# Patient Record
Sex: FEMALE | Race: WHITE | NOT HISPANIC OR LATINO | Employment: FULL TIME | URBAN - METROPOLITAN AREA
[De-identification: names, ages, dates, MRNs, and addresses within clinical notes are randomized per-mention and may not be internally consistent; named-entity substitution may affect disease eponyms.]

---

## 2021-02-14 ENCOUNTER — HOSPITAL ENCOUNTER (EMERGENCY)
Facility: HOSPITAL | Age: 31
End: 2021-02-14
Attending: EMERGENCY MEDICINE | Admitting: EMERGENCY MEDICINE
Payer: COMMERCIAL

## 2021-02-14 ENCOUNTER — HOSPITAL ENCOUNTER (OUTPATIENT)
Facility: HOSPITAL | Age: 31
Setting detail: OBSERVATION
Discharge: HOME | End: 2021-02-15
Attending: OBSTETRICS & GYNECOLOGY | Admitting: STUDENT IN AN ORGANIZED HEALTH CARE EDUCATION/TRAINING PROGRAM
Payer: COMMERCIAL

## 2021-02-14 ENCOUNTER — ANESTHESIA (EMERGENCY)
Dept: PERIOP | Facility: HOSPITAL | Age: 31
End: 2021-02-14
Payer: COMMERCIAL

## 2021-02-14 ENCOUNTER — APPOINTMENT (EMERGENCY)
Dept: ULTRASOUND IMAGING | Facility: HOSPITAL | Age: 31
End: 2021-02-14
Payer: COMMERCIAL

## 2021-02-14 ENCOUNTER — ANESTHESIA EVENT (EMERGENCY)
Dept: PERIOP | Facility: HOSPITAL | Age: 31
End: 2021-02-14
Payer: COMMERCIAL

## 2021-02-14 ENCOUNTER — APPOINTMENT (EMERGENCY)
Dept: CT IMAGING | Facility: HOSPITAL | Age: 31
End: 2021-02-14
Payer: COMMERCIAL

## 2021-02-14 VITALS
BODY MASS INDEX: 34.96 KG/M2 | DIASTOLIC BLOOD PRESSURE: 91 MMHG | OXYGEN SATURATION: 99 % | WEIGHT: 190 LBS | HEART RATE: 89 BPM | HEIGHT: 62 IN | RESPIRATION RATE: 19 BRPM | TEMPERATURE: 97.5 F | SYSTOLIC BLOOD PRESSURE: 149 MMHG

## 2021-02-14 VITALS — HEART RATE: 125 BPM

## 2021-02-14 DIAGNOSIS — O00.90 ECTOPIC PREGNANCY: Primary | ICD-10-CM

## 2021-02-14 DIAGNOSIS — O36.80X0 PREGNANCY OF UNKNOWN ANATOMIC LOCATION: Primary | ICD-10-CM

## 2021-02-14 DIAGNOSIS — O00.90 RUPTURED ECTOPIC PREGNANCY: ICD-10-CM

## 2021-02-14 LAB
ABO GROUP BLD: NORMAL
ABO GROUP BLD: NORMAL
ALBUMIN SERPL BCP-MCNC: 3.6 G/DL (ref 3.5–5)
ALP SERPL-CCNC: 55 U/L (ref 46–116)
ALT SERPL W P-5'-P-CCNC: 30 U/L (ref 12–78)
ANION GAP SERPL CALCULATED.3IONS-SCNC: 10 MMOL/L (ref 4–13)
AST SERPL W P-5'-P-CCNC: 18 U/L (ref 5–45)
B-HCG SERPL-ACNC: 986 MIU/ML
BACTERIA UR QL AUTO: NORMAL /HPF
BASOPHILS # BLD AUTO: 0.09 THOUSANDS/ΜL (ref 0–0.1)
BASOPHILS NFR BLD AUTO: 1 % (ref 0–1)
BILIRUB DIRECT SERPL-MCNC: 0.11 MG/DL (ref 0–0.2)
BILIRUB SERPL-MCNC: 0.4 MG/DL (ref 0.2–1)
BILIRUB UR QL STRIP: NEGATIVE
BLD GP AB SCN SERPL QL: NEGATIVE
BUN SERPL-MCNC: 9 MG/DL (ref 5–25)
CALCIUM SERPL-MCNC: 9.3 MG/DL (ref 8.3–10.1)
CHLORIDE SERPL-SCNC: 102 MMOL/L (ref 100–108)
CLARITY UR: CLEAR
CO2 SERPL-SCNC: 23 MMOL/L (ref 21–32)
COLOR UR: ABNORMAL
CREAT SERPL-MCNC: 0.66 MG/DL (ref 0.6–1.3)
EOSINOPHIL # BLD AUTO: 0.07 THOUSAND/ΜL (ref 0–0.61)
EOSINOPHIL NFR BLD AUTO: 1 % (ref 0–6)
ERYTHROCYTE [DISTWIDTH] IN BLOOD BY AUTOMATED COUNT: 12 % (ref 11.6–15.1)
GFR SERPL CREATININE-BSD FRML MDRD: 118 ML/MIN/1.73SQ M
GLUCOSE SERPL-MCNC: 114 MG/DL (ref 65–140)
GLUCOSE UR STRIP-MCNC: NEGATIVE MG/DL
HCG SERPL QL: POSITIVE
HCT VFR BLD AUTO: 35.4 % (ref 34.8–46.1)
HGB BLD-MCNC: 11.9 G/DL (ref 11.5–15.4)
HGB UR QL STRIP.AUTO: ABNORMAL
IMM GRANULOCYTES # BLD AUTO: 0.05 THOUSAND/UL (ref 0–0.2)
IMM GRANULOCYTES NFR BLD AUTO: 0 % (ref 0–2)
KETONES UR STRIP-MCNC: NEGATIVE MG/DL
LEUKOCYTE ESTERASE UR QL STRIP: NEGATIVE
LIPASE SERPL-CCNC: 103 U/L (ref 73–393)
LYMPHOCYTES # BLD AUTO: 2.8 THOUSANDS/ΜL (ref 0.6–4.47)
LYMPHOCYTES NFR BLD AUTO: 18 % (ref 14–44)
MCH RBC QN AUTO: 31.1 PG (ref 26.8–34.3)
MCHC RBC AUTO-ENTMCNC: 33.6 G/DL (ref 31.4–37.4)
MCV RBC AUTO: 92 FL (ref 82–98)
MONOCYTES # BLD AUTO: 0.84 THOUSAND/ΜL (ref 0.17–1.22)
MONOCYTES NFR BLD AUTO: 6 % (ref 4–12)
NEUTROPHILS # BLD AUTO: 11.4 THOUSANDS/ΜL (ref 1.85–7.62)
NEUTS SEG NFR BLD AUTO: 74 % (ref 43–75)
NITRITE UR QL STRIP: NEGATIVE
NON-SQ EPI CELLS URNS QL MICRO: NORMAL /HPF
NRBC BLD AUTO-RTO: 0 /100 WBCS
PH UR STRIP.AUTO: 5.5 [PH]
PLATELET # BLD AUTO: 373 THOUSANDS/UL (ref 149–390)
PMV BLD AUTO: 9 FL (ref 8.9–12.7)
POTASSIUM SERPL-SCNC: 3.9 MMOL/L (ref 3.5–5.3)
PROT SERPL-MCNC: 7.3 G/DL (ref 6.4–8.2)
PROT UR STRIP-MCNC: NEGATIVE MG/DL
RBC # BLD AUTO: 3.83 MILLION/UL (ref 3.81–5.12)
RBC #/AREA URNS AUTO: NORMAL /HPF
RH BLD: POSITIVE
RH BLD: POSITIVE
SODIUM SERPL-SCNC: 135 MMOL/L (ref 136–145)
SP GR UR STRIP.AUTO: <=1.005 (ref 1–1.03)
SPECIMEN EXPIRATION DATE: NORMAL
UROBILINOGEN UR QL STRIP.AUTO: 0.2 E.U./DL
WBC # BLD AUTO: 15.25 THOUSAND/UL (ref 4.31–10.16)
WBC #/AREA URNS AUTO: NORMAL /HPF

## 2021-02-14 PROCEDURE — 99219 PR INITIAL OBSERVATION CARE/DAY 50 MINUTES: CPT | Performed by: STUDENT IN AN ORGANIZED HEALTH CARE EDUCATION/TRAINING PROGRAM

## 2021-02-14 PROCEDURE — 76815 OB US LIMITED FETUS(S): CPT

## 2021-02-14 PROCEDURE — 84703 CHORIONIC GONADOTROPIN ASSAY: CPT | Performed by: EMERGENCY MEDICINE

## 2021-02-14 PROCEDURE — 59150 TREAT ECTOPIC PREGNANCY: CPT | Performed by: STUDENT IN AN ORGANIZED HEALTH CARE EDUCATION/TRAINING PROGRAM

## 2021-02-14 PROCEDURE — 96360 HYDRATION IV INFUSION INIT: CPT

## 2021-02-14 PROCEDURE — 85025 COMPLETE CBC W/AUTO DIFF WBC: CPT | Performed by: EMERGENCY MEDICINE

## 2021-02-14 PROCEDURE — 86900 BLOOD TYPING SEROLOGIC ABO: CPT | Performed by: STUDENT IN AN ORGANIZED HEALTH CARE EDUCATION/TRAINING PROGRAM

## 2021-02-14 PROCEDURE — 84702 CHORIONIC GONADOTROPIN TEST: CPT | Performed by: EMERGENCY MEDICINE

## 2021-02-14 PROCEDURE — 80076 HEPATIC FUNCTION PANEL: CPT | Performed by: EMERGENCY MEDICINE

## 2021-02-14 PROCEDURE — 86850 RBC ANTIBODY SCREEN: CPT | Performed by: STUDENT IN AN ORGANIZED HEALTH CARE EDUCATION/TRAINING PROGRAM

## 2021-02-14 PROCEDURE — 99285 EMERGENCY DEPT VISIT HI MDM: CPT | Performed by: EMERGENCY MEDICINE

## 2021-02-14 PROCEDURE — 83690 ASSAY OF LIPASE: CPT | Performed by: EMERGENCY MEDICINE

## 2021-02-14 PROCEDURE — 36415 COLL VENOUS BLD VENIPUNCTURE: CPT | Performed by: EMERGENCY MEDICINE

## 2021-02-14 PROCEDURE — 88305 TISSUE EXAM BY PATHOLOGIST: CPT | Performed by: PATHOLOGY

## 2021-02-14 PROCEDURE — 81001 URINALYSIS AUTO W/SCOPE: CPT | Performed by: EMERGENCY MEDICINE

## 2021-02-14 PROCEDURE — 99285 EMERGENCY DEPT VISIT HI MDM: CPT

## 2021-02-14 PROCEDURE — 86901 BLOOD TYPING SEROLOGIC RH(D): CPT | Performed by: STUDENT IN AN ORGANIZED HEALTH CARE EDUCATION/TRAINING PROGRAM

## 2021-02-14 PROCEDURE — 86920 COMPATIBILITY TEST SPIN: CPT

## 2021-02-14 PROCEDURE — 80048 BASIC METABOLIC PNL TOTAL CA: CPT | Performed by: EMERGENCY MEDICINE

## 2021-02-14 RX ORDER — MORPHINE SULFATE 10 MG/ML
6 INJECTION, SOLUTION INTRAMUSCULAR; INTRAVENOUS ONCE
Status: DISCONTINUED | OUTPATIENT
Start: 2021-02-14 | End: 2021-02-14 | Stop reason: HOSPADM

## 2021-02-14 RX ORDER — NEOSTIGMINE METHYLSULFATE 1 MG/ML
INJECTION INTRAVENOUS AS NEEDED
Status: DISCONTINUED | OUTPATIENT
Start: 2021-02-14 | End: 2021-02-14

## 2021-02-14 RX ORDER — GLYCOPYRROLATE 0.2 MG/ML
INJECTION INTRAMUSCULAR; INTRAVENOUS AS NEEDED
Status: DISCONTINUED | OUTPATIENT
Start: 2021-02-14 | End: 2021-02-14

## 2021-02-14 RX ORDER — ONDANSETRON 2 MG/ML
4 INJECTION INTRAMUSCULAR; INTRAVENOUS ONCE
Status: DISCONTINUED | OUTPATIENT
Start: 2021-02-14 | End: 2021-02-14 | Stop reason: HOSPADM

## 2021-02-14 RX ORDER — MAGNESIUM HYDROXIDE 1200 MG/15ML
LIQUID ORAL AS NEEDED
Status: DISCONTINUED | OUTPATIENT
Start: 2021-02-14 | End: 2021-02-14 | Stop reason: HOSPADM

## 2021-02-14 RX ORDER — ONDANSETRON 2 MG/ML
4 INJECTION INTRAMUSCULAR; INTRAVENOUS EVERY 6 HOURS PRN
Status: DISCONTINUED | OUTPATIENT
Start: 2021-02-14 | End: 2021-02-15 | Stop reason: HOSPADM

## 2021-02-14 RX ORDER — OXYCODONE HYDROCHLORIDE 5 MG/1
5 TABLET ORAL EVERY 4 HOURS PRN
Status: DISCONTINUED | OUTPATIENT
Start: 2021-02-14 | End: 2021-02-15 | Stop reason: HOSPADM

## 2021-02-14 RX ORDER — SODIUM CHLORIDE, SODIUM LACTATE, POTASSIUM CHLORIDE, CALCIUM CHLORIDE 600; 310; 30; 20 MG/100ML; MG/100ML; MG/100ML; MG/100ML
125 INJECTION, SOLUTION INTRAVENOUS CONTINUOUS
Status: DISCONTINUED | OUTPATIENT
Start: 2021-02-14 | End: 2021-02-15 | Stop reason: HOSPADM

## 2021-02-14 RX ORDER — HYDROMORPHONE HCL/PF 1 MG/ML
0.2 SYRINGE (ML) INJECTION
Status: DISCONTINUED | OUTPATIENT
Start: 2021-02-14 | End: 2021-02-14 | Stop reason: HOSPADM

## 2021-02-14 RX ORDER — HYDROMORPHONE HCL/PF 1 MG/ML
0.5 SYRINGE (ML) INJECTION
Status: DISCONTINUED | OUTPATIENT
Start: 2021-02-14 | End: 2021-02-15 | Stop reason: HOSPADM

## 2021-02-14 RX ORDER — SODIUM CHLORIDE, SODIUM LACTATE, POTASSIUM CHLORIDE, CALCIUM CHLORIDE 600; 310; 30; 20 MG/100ML; MG/100ML; MG/100ML; MG/100ML
INJECTION, SOLUTION INTRAVENOUS CONTINUOUS PRN
Status: DISCONTINUED | OUTPATIENT
Start: 2021-02-14 | End: 2021-02-14

## 2021-02-14 RX ORDER — DIPHENHYDRAMINE HYDROCHLORIDE 50 MG/ML
12.5 INJECTION INTRAMUSCULAR; INTRAVENOUS ONCE AS NEEDED
Status: DISCONTINUED | OUTPATIENT
Start: 2021-02-14 | End: 2021-02-14 | Stop reason: HOSPADM

## 2021-02-14 RX ORDER — ALBUMIN, HUMAN INJ 5% 5 %
SOLUTION INTRAVENOUS CONTINUOUS PRN
Status: DISCONTINUED | OUTPATIENT
Start: 2021-02-14 | End: 2021-02-14

## 2021-02-14 RX ORDER — SUCCINYLCHOLINE/SOD CL,ISO/PF 100 MG/5ML
SYRINGE (ML) INTRAVENOUS AS NEEDED
Status: DISCONTINUED | OUTPATIENT
Start: 2021-02-14 | End: 2021-02-14

## 2021-02-14 RX ORDER — ACETAMINOPHEN 325 MG/1
975 TABLET ORAL ONCE
Status: COMPLETED | OUTPATIENT
Start: 2021-02-14 | End: 2021-02-14

## 2021-02-14 RX ORDER — ONDANSETRON 2 MG/ML
INJECTION INTRAMUSCULAR; INTRAVENOUS AS NEEDED
Status: DISCONTINUED | OUTPATIENT
Start: 2021-02-14 | End: 2021-02-14

## 2021-02-14 RX ORDER — LIDOCAINE HYDROCHLORIDE 10 MG/ML
INJECTION, SOLUTION EPIDURAL; INFILTRATION; INTRACAUDAL; PERINEURAL AS NEEDED
Status: DISCONTINUED | OUTPATIENT
Start: 2021-02-14 | End: 2021-02-14

## 2021-02-14 RX ORDER — DOCUSATE SODIUM 100 MG/1
100 CAPSULE, LIQUID FILLED ORAL 2 TIMES DAILY
Status: DISCONTINUED | OUTPATIENT
Start: 2021-02-14 | End: 2021-02-15 | Stop reason: HOSPADM

## 2021-02-14 RX ORDER — IBUPROFEN 600 MG/1
600 TABLET ORAL EVERY 6 HOURS PRN
Status: DISCONTINUED | OUTPATIENT
Start: 2021-02-14 | End: 2021-02-15 | Stop reason: HOSPADM

## 2021-02-14 RX ORDER — DEXAMETHASONE SODIUM PHOSPHATE 10 MG/ML
INJECTION, SOLUTION INTRAMUSCULAR; INTRAVENOUS AS NEEDED
Status: DISCONTINUED | OUTPATIENT
Start: 2021-02-14 | End: 2021-02-14

## 2021-02-14 RX ORDER — ONDANSETRON 2 MG/ML
4 INJECTION INTRAMUSCULAR; INTRAVENOUS EVERY 4 HOURS PRN
Status: DISCONTINUED | OUTPATIENT
Start: 2021-02-14 | End: 2021-02-14 | Stop reason: HOSPADM

## 2021-02-14 RX ORDER — FENTANYL CITRATE/PF 50 MCG/ML
25 SYRINGE (ML) INJECTION
Status: DISCONTINUED | OUTPATIENT
Start: 2021-02-14 | End: 2021-02-14 | Stop reason: HOSPADM

## 2021-02-14 RX ORDER — OXYCODONE HYDROCHLORIDE 10 MG/1
10 TABLET ORAL EVERY 4 HOURS PRN
Status: DISCONTINUED | OUTPATIENT
Start: 2021-02-14 | End: 2021-02-15 | Stop reason: HOSPADM

## 2021-02-14 RX ORDER — FENTANYL CITRATE 50 UG/ML
INJECTION, SOLUTION INTRAMUSCULAR; INTRAVENOUS AS NEEDED
Status: DISCONTINUED | OUTPATIENT
Start: 2021-02-14 | End: 2021-02-14

## 2021-02-14 RX ORDER — PROPOFOL 10 MG/ML
INJECTION, EMULSION INTRAVENOUS AS NEEDED
Status: DISCONTINUED | OUTPATIENT
Start: 2021-02-14 | End: 2021-02-14

## 2021-02-14 RX ORDER — ROCURONIUM BROMIDE 10 MG/ML
INJECTION, SOLUTION INTRAVENOUS AS NEEDED
Status: DISCONTINUED | OUTPATIENT
Start: 2021-02-14 | End: 2021-02-14

## 2021-02-14 RX ADMIN — ROCURONIUM BROMIDE 30 MG: 10 SOLUTION INTRAVENOUS at 12:25

## 2021-02-14 RX ADMIN — GLYCOPYRROLATE 0.4 MG: 0.2 INJECTION, SOLUTION INTRAMUSCULAR; INTRAVENOUS at 13:46

## 2021-02-14 RX ADMIN — ALBUMIN HUMAN: 0.05 INJECTION, SOLUTION INTRAVENOUS at 11:59

## 2021-02-14 RX ADMIN — FENTANYL CITRATE 50 MCG: 50 INJECTION, SOLUTION INTRAMUSCULAR; INTRAVENOUS at 12:23

## 2021-02-14 RX ADMIN — SODIUM CHLORIDE, SODIUM LACTATE, POTASSIUM CHLORIDE, AND CALCIUM CHLORIDE: .6; .31; .03; .02 INJECTION, SOLUTION INTRAVENOUS at 13:49

## 2021-02-14 RX ADMIN — FENTANYL CITRATE 25 MCG: 50 INJECTION INTRAMUSCULAR; INTRAVENOUS at 14:09

## 2021-02-14 RX ADMIN — ALBUMIN HUMAN: 0.05 INJECTION, SOLUTION INTRAVENOUS at 12:52

## 2021-02-14 RX ADMIN — HYDROMORPHONE HYDROCHLORIDE 0.2 MG: 1 INJECTION, SOLUTION INTRAMUSCULAR; INTRAVENOUS; SUBCUTANEOUS at 15:11

## 2021-02-14 RX ADMIN — ROCURONIUM BROMIDE 5 MG: 10 SOLUTION INTRAVENOUS at 13:03

## 2021-02-14 RX ADMIN — OXYCODONE HYDROCHLORIDE 5 MG: 5 TABLET ORAL at 17:32

## 2021-02-14 RX ADMIN — PROPOFOL 180 MG: 10 INJECTION, EMULSION INTRAVENOUS at 12:23

## 2021-02-14 RX ADMIN — SODIUM CHLORIDE, SODIUM LACTATE, POTASSIUM CHLORIDE, AND CALCIUM CHLORIDE: .6; .31; .03; .02 INJECTION, SOLUTION INTRAVENOUS at 12:18

## 2021-02-14 RX ADMIN — LIDOCAINE HYDROCHLORIDE 50 MG: 10 INJECTION, SOLUTION EPIDURAL; INFILTRATION; INTRACAUDAL at 12:23

## 2021-02-14 RX ADMIN — Medication 100 MG: at 12:23

## 2021-02-14 RX ADMIN — FENTANYL CITRATE 25 MCG: 50 INJECTION INTRAMUSCULAR; INTRAVENOUS at 14:22

## 2021-02-14 RX ADMIN — HYDROMORPHONE HYDROCHLORIDE 0.5 MG: 1 INJECTION, SOLUTION INTRAMUSCULAR; INTRAVENOUS; SUBCUTANEOUS at 19:33

## 2021-02-14 RX ADMIN — SODIUM CHLORIDE 1000 ML: 0.9 INJECTION, SOLUTION INTRAVENOUS at 09:36

## 2021-02-14 RX ADMIN — ONDANSETRON 4 MG: 2 INJECTION INTRAMUSCULAR; INTRAVENOUS at 13:03

## 2021-02-14 RX ADMIN — PHENYLEPHRINE HYDROCHLORIDE 150 MCG: 10 INJECTION INTRAVENOUS at 12:23

## 2021-02-14 RX ADMIN — ACETAMINOPHEN 975 MG: 325 TABLET ORAL at 07:22

## 2021-02-14 RX ADMIN — SODIUM CHLORIDE, SODIUM LACTATE, POTASSIUM CHLORIDE, AND CALCIUM CHLORIDE 125 ML/HR: .6; .31; .03; .02 INJECTION, SOLUTION INTRAVENOUS at 23:11

## 2021-02-14 RX ADMIN — SODIUM CHLORIDE, SODIUM LACTATE, POTASSIUM CHLORIDE, AND CALCIUM CHLORIDE: .6; .31; .03; .02 INJECTION, SOLUTION INTRAVENOUS at 11:53

## 2021-02-14 RX ADMIN — SODIUM CHLORIDE, SODIUM LACTATE, POTASSIUM CHLORIDE, AND CALCIUM CHLORIDE 125 ML/HR: .6; .31; .03; .02 INJECTION, SOLUTION INTRAVENOUS at 15:42

## 2021-02-14 RX ADMIN — ROCURONIUM BROMIDE 10 MG: 10 SOLUTION INTRAVENOUS at 12:37

## 2021-02-14 RX ADMIN — DOCUSATE SODIUM 100 MG: 100 CAPSULE, LIQUID FILLED ORAL at 17:32

## 2021-02-14 RX ADMIN — DEXAMETHASONE SODIUM PHOSPHATE 10 MG: 10 INJECTION, SOLUTION INTRAMUSCULAR; INTRAVENOUS at 12:25

## 2021-02-14 RX ADMIN — NEOSTIGMINE METHYLSULFATE 2 MG: 1 INJECTION INTRAVENOUS at 13:46

## 2021-02-14 RX ADMIN — FENTANYL CITRATE 50 MCG: 50 INJECTION, SOLUTION INTRAMUSCULAR; INTRAVENOUS at 13:32

## 2021-02-14 RX ADMIN — FENTANYL CITRATE 25 MCG: 50 INJECTION INTRAMUSCULAR; INTRAVENOUS at 14:34

## 2021-02-14 NOTE — EMTALA/ACUTE CARE TRANSFER
[address] [facility] [address]  [address] [place] [address]  Dept: [phone]      EMTALA TRANSFER CONSENT    NAME Danisha Tirado                                         1990                              MRN [id number]    I have been informed of my rights regarding examination, treatment, and transfer   by Dr Nico Guzman MD    Benefits: Specialized equipment and/or services available at the receiving facility (Include comment)________________________    Risks: Potential for delay in receiving treatment, Other: (Include comment)__________________________      Transfer Request   I acknowledge that my medical condition has been evaluated and explained to me by the emergency department physician or other qualified medical person and/or my attending physician who has recommended and offered to me further medical examination and treatment  I understand the Hospital's obligation with respect to the treatment and stabilization of my emergency medical condition  I nevertheless request to be transferred  I release the Hospital, the doctor, and any other persons caring for me from all responsibility or liability for any injury or ill effects that may result from my transfer and agree to accept all responsibility for the consequences of my choice to transfer, rather than receive stabilizing treatment at the Hospital  I understand that because the transfer is my request, my insurance may not provide reimbursement for the services  The Hospital will assist and direct me and my family in how to make arrangements for transfer, but the hospital is not liable for any fees charged by the transport service  In spite of this understanding, I refuse to consent to further medical examination and treatment which has been offered to me, and request transfer to  [address] Name, [facility] & State : Gurdeep Ferrara   I authorize the performance of emergency medical procedures and treatments upon me in both transit and upon arrival at the receiving facility  Additionally, I authorize the release of any and all medical records to the receiving facility and request they be transported with me, if possible  I authorize the performance of emergency medical procedures and treatments upon me in both transit and upon arrival at the receiving facility  Additionally, I authorize the release of any and all medical records to the receiving facility and request they be transported with me, if possible  I understand that the safest mode of transportation during a medical emergency is an ambulance and that the Hospital advocates the use of this mode of transport  Risks of traveling to the receiving facility by car, including absence of medical control, life sustaining equipment, such as oxygen, and medical personnel has been explained to me and I fully understand them  (BRIANNE CORRECT BOX BELOW)  [  ]  I consent to the stated transfer and to be transported by ambulance/helicopter  [  ]  I consent to the stated transfer, but refuse transportation by ambulance and accept full responsibility for my transportation by car  I understand the risks of non-ambulance transfers and I exonerate the Hospital and its staff from any deterioration in my condition that results from this refusal     X___________________________________________    DATE  21  TIME________  Signature of patient or legally responsible individual signing on patient behalf           RELATIONSHIP TO PATIENT_________________________          Provider Certification    NAME Steve Bell                                        [facility] 1990                              MRN [id number]    A medical screening exam was performed on the above named patient  Based on the examination:    Condition Necessitating Transfer There were no encounter diagnoses      Patient Condition: The patient has been stabilized such that within reasonable medical probability, no material deterioration of the patient condition or the condition of the unborn child(millie) is likely to result from the transfer    Reason for Transfer: Level of Care needed not available at this facility    Transfer Requirements: [address]   · Space available and qualified personnel available for treatment as acknowledged by Patient access  · Agreed to accept transfer and to provide appropriate medical treatment as acknowledged by       Dr Berg  · Appropriate medical records of the examination and treatment of the patient are provided at the time of transfer   [address], [address] _______  · Transfer will be performed by qualified personnel from [address] [place] [address] Emergency transport  and appropriate transfer equipment as required, including the use of necessary and appropriate life support measures  Provider Certification: I have examined the patient and explained the following risks and benefits of being transferred/refusing transfer to the patient/family:  General risk, such as traffic hazards, adverse weather conditions, rough terrain or turbulence, possible failure of equipment (including vehicle or aircraft), or consequences of actions of persons outside the control of the transport personnel      Based on these reasonable risks and benefits to the patient and/or the unborn child(millie), and based upon the information available at the time of the patients examination, I certify that the medical benefits reasonably to be expected from the provision of appropriate medical treatments at another medical facility outweigh the increasing risks, if any, to the individuals medical condition, and in the case of labor to the unborn child, from effecting the transfer      X____________________________________________ DATE 02/14/21        TIME_______      ORIGINAL - SEND TO MEDICAL RECORDS   COPY - SEND WITH PATIENT DURING TRANSFER

## 2021-02-14 NOTE — ANESTHESIA POSTPROCEDURE EVALUATION
Post-Op Assessment Note    CV Status:  Stable  Pain Score: 0    Pain management: adequate     Mental Status:  Alert and awake   Hydration Status:  Euvolemic   PONV Controlled:  Controlled   Airway Patency:  Patent      Post Op Vitals Reviewed: Yes      Staff: CRNA         No complications documented      BP   127/81   Temp 98 6   Pulse 119   Resp 25   SpO2 100

## 2021-02-14 NOTE — H&P
Preoperative History and Physical  Procedure:  Laparoscopic salpingectomy  DOS:  2/12/21                                                                Location:   [facility]    Margarita Kee is a 31 y o  Wisam Flatter who presents for preoperative history and physical   Please see my previous clinic notes for full details  Briefly, Margarita Kee had severe lower abdominal pain that started last night and was seen at [facility] ED  She was noted to have a quant of 986 and a TVUS that showed no IUP or adnexal mass with moderate complex free fluid with indeterminate heterogenous right adnexal structure  She was transferred to [facility] ED for ruptured ectopic pregnancy  This is an unplanned pregnancy but desired pregnancy  She was recently restarted on birth controls about a month ago  She believes her LMP was 2/7/21  She sees a OB/GYN in [place]  History reviewed  No pertinent past medical history  History reviewed  No pertinent surgical history  Past OB/Gyn History:  She's had one prior termination when she was younger but no other pregnancies  History reviewed  No pertinent family history    Social History:  Social History     Socioeconomic History    Marital status: Single     Spouse name: Not on file    Number of children: Not on file    Years of education: Not on file    Highest education level: Not on file   Occupational History    Not on file   Social Needs    Financial resource strain: Not on file    Food insecurity     Worry: Not on file     Inability: Not on file    Transportation needs     Medical: Not on file     Non-medical: Not on file   Tobacco Use    Smoking status: Current Every Day Smoker    Smokeless tobacco: Never Used   Substance and Sexual Activity    Alcohol use: Not Currently    Drug use: Not Currently    Sexual activity: Not on file   Lifestyle    Physical activity     Days per week: Not on file     Minutes per session: Not on file    Stress: Not on file Relationships    Social connections     Talks on phone: Not on file     Gets together: Not on file     Attends [employer] service: Not on file     Active member of club or organization: Not on file     Attends meetings of clubs or organizations: Not on file     Relationship status: Not on file    Intimate partner violence     Fear of current or ex partner: Not on file     Emotionally abused: Not on file     Physically abused: Not on file     Forced sexual activity: Not on file   Other Topics Concern    Not on file   Social History Narrative    Not on file     No Known Allergies  No current facility-administered medications for this encounter  No current outpatient medications on file  Review of Systems:  A complete review of systems was performed and was negative, except as listed  Physical Exam:  /73 (BP Location: Right arm)   Pulse 88   Temp 97 5 °F (36 4 °C) (Oral)   Resp 18   LMP 2021   SpO2 98%   GEN: The patient was alert and oriented x3, pleasant well-appearing female in no acute distress  HEENT:  Unremarkable, no anterior or posterior lymphadenopathy, no thyromegaly  CV:  RRR, no murmurs  RESP:  Clear to auscultation bilaterally  BREAST:  Deferred  ABD:  Soft, tender mostly periumbilical and midline, slightly more tenderness in RLQ>LLQ nondistended  EXT:  WWP, nontender, no edema  BACK:  No CVA tenderness, no tenderness to palpation along spine  PELVIC:  Deferred today  Assessment & Plan: Paula Lovelace is a 31 y o   with right ruptured ectopic, she is planning a right laparoscopic salpingectomy on 21  Discussed benefits, risks, and alternatives of surgery  Given her imaging of hemoperitoneum, would strongly recommend surgery at this time  Risks include but not limited to bleeding, infection, pain, injury to surrounding organs, nerves, and vessels, and possible conversion to exploratory laparotomy   Patient is aware that given her low beta hcg, there is a chance that this could be a IUP and if that's the case, by going under general anesthesia, there is a risk of a miscarriage  Discussed risk of allergic reaction to general anesthesia and need for blood transfusion  Patient expresses understanding  All questions answered  Consent signed      Discussed with Dr Martita Bower  2/14/2021  12:09 PM

## 2021-02-14 NOTE — ANESTHESIA PREPROCEDURE EVALUATION
Procedure:  SALPINGECTOMY, LAPAROSCOPIC (Right Pelvis)    Relevant Problems   ANESTHESIA (within normal limits)        Physical Exam    Airway    Mallampati score: I  TM Distance: >3 FB  Neck ROM: full     Dental   No notable dental hx     Cardiovascular  Rate: normal,     Pulmonary  Pulmonary exam normal     Other Findings        Anesthesia Plan  ASA Score- 2 Emergent    Anesthesia Type- general with ASA Monitors  Additional Monitors:   Airway Plan: ETT  Plan Factors-Exercise tolerance (METS): >4 METS  Chart reviewed  Imaging results reviewed  Existing labs reviewed  Patient summary reviewed  Patient is a current smoker  Patient not instructed to abstain from smoking on day of procedure  Patient smoked on day of surgery  Induction- intravenous  Postoperative Plan- Plan for postoperative opioid use  Informed Consent- Anesthetic plan and risks discussed with patient  I personally reviewed this patient with the CRNA  Discussed and agreed on the Anesthesia Plan with the CRNA  Rm Smiley

## 2021-02-14 NOTE — OP NOTE
OPERATIVE REPORT  PATIENT NAME: Leandro Kauffman    :  1990  MRN: [id number]  Pt Location: [facility]    SURGERY DATE: 2021    Surgeon(s) and Role:     * Jacoby Smallwood MD - Primary     * Ronal Shepherd MD - Assisting    Preop Diagnosis:  Ruptured ectopic pregnancy [O00 90]    Post-Op Diagnosis Codes:     Hemorrhagic ovarian cyst    Procedure(s) (LRB):  diagnostic LAPAROSCOPIC evacuation of hemoperitoneam, (N/A)    Specimen(s):  ID Type Source Tests Collected by Time Destination   1 : possible cyst wall from right ovary Tissue Ovary, Right TISSUE EXAM Jacoby Smallwood MD 2021 1330        Estimated Blood Loss:   Minimal    Drains:  [REMOVED] NG/OG/Enteral Tube Orogastric 18 Fr Right mouth (Removed)   Number of days: 0       Anesthesia Type:   General    Operative Indications:  Ruptured ectopic pregnancy [O00 90]      Operative Findings:  300 cc clotted blood surrounding the uterus, mostly under the right ovary and fallopian tube  Slow bleeding from left ovarian cortex  Normal appearing bilateral fallopian tubes  Normal appearing uterus    Complications:   None    Procedure and Technique:  The patient was taken to the operating room and placed in supine position on OR table  General anesthesia was established without difficulty  The patient was then positioned on the operating table in the dorsal lithotomy position with the legs supported using stirrups  All pressure points were padded and a Ella hugger was placed to maintain control of core body temperature  The patient was then prepped and draped in the usual sterile fashion  A time out was performed to confirm correct patient and correct procedure  A sponge stick was used to manipulate the uterus  Attention was turned to the abdomen  A small incision was then made vertically in the umbilicus, and the veress needle was used to enter the peritoneum   The opening pressure was elevated x 3, and so the abdomen was entered under direct visualization  Pneumoperitoneum was established to 15mmHg and maintained using carbon dioxide  Subsequently, two additional small incisions were made in both the right and left lower quadrants, approximately 3 cm above and 2 cm medial to the anterior superior iliac spine  Two ports 5mm (left), 5mm (right) were introduced under direct visualization  The patient was placed in trendelenburg, the entire pelvis was inspected revealing the above noted findings  Posterior cul de sac and ovarian fossa inspected and normal  The clot was suctioned irrigated and removed  The uterus was deviated over to the left to expose the right adnexa  Slow oozing was noted to be coming from the distal end of the right ovary  The enseal was used to attempt hemostasis  There was continued oozing so the monopolar spatula was used  Hemostasis was noted  Surgiflow was then placed over the area  There was a large clot that was unable to be broken up and did not fit through the trocar  It was removed in a 5 mm bag and sent to ensure no pathology, possible cyst wall or tissue  The abdomen and pelvis was again visualized and good hemostasis was noted  Pneumoperitoneum was then evacuated  Trocars were removed  The trochar incisions were closed with Histoacryl  All instruments were then removed from the vagina  She was placed in the supine position and awakened from general anesthesia without difficulty  She tolerated the procedure well and was transferred to the recovery room in stable condition  All needle, sponge, and instrument counts were noted to be correct at the end of the procedure        I was present for the entire procedure    Patient Disposition:  PACU     SIGNATURE: Meche Bass MD  DATE: February 14, 2021  TIME: 2:04 PM

## 2021-02-14 NOTE — ED NOTES
Pt transported to [facility] , [address] [place] [address]  02/14/21 5068

## 2021-02-14 NOTE — ED PROVIDER NOTES
History  Chief Complaint   Patient presents with    Abdominal Pain     pt co of mid abd pain onset last night, denies N/V/D     HPI patient is a 67-year-old female, presents emergency department with mid to lower abdominal pain that started last night  Pain is severe in nature according to the patient, somewhat peaks and valleys but is fairly constant  There is no radiation to the back  She denies any nausea vomiting or diarrhea  Denies any urinary symptoms  Patient describes the pain as fairly constant but again at times becomes very sharp or like a pressure sensation  No previous episodes of similar pain, no surgery denies urinary tract infections in the past   Patient does not believe she is pregnant recently restarted birth control  Past medical history previously healthy  Family history noncontributory no infiltrates or smoker, denies drug abuse    None       History reviewed  No pertinent past medical history  History reviewed  No pertinent surgical history  History reviewed  No pertinent family history  I have reviewed and agree with the history as documented  E-Cigarette/Vaping     E-Cigarette/Vaping Substances     Social History     Tobacco Use    Smoking status: Current Every Day Smoker    Smokeless tobacco: Never Used   Substance Use Topics    Alcohol use: Not Currently    Drug use: Not Currently       Review of Systems   Constitutional: Negative for diaphoresis, fatigue and fever  HENT: Negative for congestion, ear pain, nosebleeds and sore throat  Eyes: Negative for photophobia, pain, discharge and visual disturbance  Respiratory: Negative for cough, choking, chest tightness, shortness of breath and wheezing  Cardiovascular: Negative for chest pain and palpitations  Gastrointestinal: Positive for abdominal pain  Negative for abdominal distention, diarrhea and vomiting  Genitourinary: Negative for dysuria, flank pain and frequency     Musculoskeletal: Negative for back pain, gait problem and joint swelling  Skin: Negative for color change and rash  Neurological: Negative for dizziness, syncope and headaches  Psychiatric/Behavioral: Negative for behavioral problems and confusion  The patient is not nervous/anxious  All other systems reviewed and are negative  Physical Exam  Physical Exam  Vitals signs and nursing note reviewed  Constitutional:       Appearance: She is well-developed  HENT:      Head: Normocephalic  Right Ear: External ear normal       Left Ear: External ear normal       Nose: Nose normal    Eyes:      General: Lids are normal       Pupils: Pupils are equal, round, and reactive to light  Neck:      Musculoskeletal: Normal range of motion and neck supple  Cardiovascular:      Rate and Rhythm: Normal rate and regular rhythm  Pulses: Normal pulses  Heart sounds: Normal heart sounds  Pulmonary:      Effort: Pulmonary effort is normal  No respiratory distress  Breath sounds: Normal breath sounds  Abdominal:      General: Abdomen is flat  Bowel sounds are normal       Tenderness: There is abdominal tenderness  Comments: Periumbilical and lower quadrant tenderness without rebound or guarding   Musculoskeletal: Normal range of motion  General: No deformity  Skin:     General: Skin is warm and dry  Neurological:      Mental Status: She is alert and oriented to person, place, and time           Vital Signs  ED Triage Vitals [02/14/21 0702]   Temperature Pulse Respirations Blood Pressure SpO2   97 5 °F (36 4 °C) (!) 117 19 155/100 98 %      Temp Source Heart Rate Source Patient Position - Orthostatic VS BP Location FiO2 (%)   Temporal Monitor Sitting Right arm --      Pain Score       7           Vitals:    02/14/21 0702 02/14/21 0945   BP: 155/100 149/91   Pulse: (!) 117 89   Patient Position - Orthostatic VS: Sitting          Visual Acuity      ED Medications  Medications   acetaminophen (TYLENOL) tablet 975 mg (975 mg Oral Given 2/14/21 0722)   sodium chloride 0 9 % bolus 1,000 mL (1,000 mL Intravenous New Bag 2/14/21 0936)       Diagnostic Studies  Results Reviewed     Procedure Component Value Units Date/Time    hCG, quantitative [[id number]]  (Abnormal) Collected: 02/14/21 0726    Lab Status: Final result Specimen: Blood from Arm, Right Updated: 02/14/21 0856     HCG, Quant 986 mIU/mL     Narrative:       Expected Ranges:     Approximate               Approximate HCG  Gestation age          Concentration ( mIU/mL)  _____________          ______________________   Melody Hunting                      HCG values  0 2-1                       5-50  1-2                           2-3                         100-5000  3-4                         500-11929  4-5                         1000-93436  5-6                         [id number]  6-8                         57825-996693  8-12                        21878-247720      Hepatic function panel [[id number]]  (Normal) Collected: 02/14/21 0726    Lab Status: Final result Specimen: Blood from Arm, Right Updated: 02/14/21 0815     Total Bilirubin 0 40 mg/dL      Bilirubin, Direct 0 11 mg/dL      Alkaline Phosphatase 55 U/L      AST 18 U/L      ALT 30 U/L      Total Protein 7 3 g/dL      Albumin 3 6 g/dL     Lipase [[id number]]  (Normal) Collected: 02/14/21 0726    Lab Status: Final result Specimen: Blood from Arm, Right Updated: 02/14/21 0815     Lipase 103 u/L     hCG, qualitative pregnancy [[id number]]  (Abnormal) Collected: 02/14/21 0726    Lab Status: Final result Specimen: Blood from Arm, Right Updated: 02/14/21 0815     Preg, Serum Positive    Urine Microscopic [[id number]]  (Normal) Collected: 02/14/21 0723    Lab Status: Final result Specimen: Urine, Clean Catch Updated: 02/14/21 0756     RBC, UA 0-1 /hpf      WBC, UA None Seen /hpf      Epithelial Cells None Seen /hpf      Bacteria, UA None Seen /hpf     Basic metabolic panel [[id number]]  (Abnormal) Collected: 02/14/21 0726 Lab Status: Final result Specimen: Blood from Arm, Right Updated: 02/14/21 0749     Sodium 135 mmol/L      Potassium 3 9 mmol/L      Chloride 102 mmol/L      CO2 23 mmol/L      ANION GAP 10 mmol/L      BUN 9 mg/dL      Creatinine 0 66 mg/dL      Glucose 114 mg/dL      Calcium 9 3 mg/dL      eGFR 118 ml/min/1 73sq m     Narrative:      [place] guidelines for Chronic Kidney Disease (CKD):     Stage 1 with normal or high GFR (GFR > 90 mL/min/1 73 square meters)    Stage 2 Mild CKD (GFR = 60-89 mL/min/1 73 square meters)    Stage 3A Moderate CKD (GFR = 45-59 mL/min/1 73 square meters)    Stage 3B Moderate CKD (GFR = 30-44 mL/min/1 73 square meters)    Stage 4 Severe CKD (GFR = 15-29 mL/min/1 73 square meters)    Stage 5 End Stage CKD (GFR <15 mL/min/1 73 square meters)  Note: GFR calculation is accurate only with a steady state creatinine    UA w Reflex to Microscopic w Reflex to Culture [[id number]]  (Abnormal) Collected: 02/14/21 0723    Lab Status: Final result Specimen: Urine, Clean Catch Updated: 02/14/21 0741     Color, UA Light Yellow     Clarity, UA Clear     Specific Gravity, UA <=1 005     pH, UA 5 5     Leukocytes, UA Negative     Nitrite, UA Negative     Protein, UA Negative mg/dl      Glucose, UA Negative mg/dl      Ketones, UA Negative mg/dl      Urobilinogen, UA 0 2 E U /dl      Bilirubin, UA Negative     Blood, UA Trace-Intact    CBC and differential [[id number]]  (Abnormal) Collected: 02/14/21 0726    Lab Status: Final result Specimen: Blood from Arm, Right Updated: 02/14/21 0732     WBC 15 25 Thousand/uL      RBC 3 83 Million/uL      Hemoglobin 11 9 g/dL      Hematocrit 35 4 %      MCV 92 fL      MCH 31 1 pg      MCHC 33 6 g/dL      RDW 12 0 %      MPV 9 0 fL      Platelets 320 Thousands/uL      nRBC 0 /100 WBCs      Neutrophils Relative 74 %      Immat GRANS % 0 %      Lymphocytes Relative 18 %      Monocytes Relative 6 %      Eosinophils Relative 1 %      Basophils Relative 1 %      Neutrophils Absolute 11 40 Thousands/µL      Immature Grans Absolute 0 05 Thousand/uL      Lymphocytes Absolute 2 80 Thousands/µL      Monocytes Absolute 0 84 Thousand/µL      Eosinophils Absolute 0 07 Thousand/µL      Basophils Absolute 0 09 Thousands/µL                  US OB pregnancy limited with transvaginal   Final Result by Ida Rosa MD ( 0273)   No intrauterine gestation or adnexal mass identified  Moderate complex free fluid with indeterminate heterogeneous right adnexal structure  Differential considerations include ruptured ectopic pregnancy, very early IUP and spontaneous   Correlate with serial quantitative BHCG  I personally discussed this study with Anusha Drakema on 2021 at 9:35 AM                Workstation performed: [id number]                    Procedures  Procedures         ED Course            pregnancy test is positive, communicated with CT and canceled the CT scan  Wrote for an ultrasound  Discussed with patient possibilities or early pregnancy, early miscarriage or ectopic pregnancy  other diagnostic testing     15,000 white count, nonspecific, possible stress response or inflammation     quantitative hCG was 986,  Liver functions were normal  Lipase was normal  Urinalysis showed no sign of infection  White [place] was elevated at 89856 possible stress response  Hemoglobin is normal limb 0 9 no sign of anemia  ultrasound showed marked amount of free fluid  I was advised by the tech, reviewed this study and called obstetrics  I spoke with obstetrics covering  Brandi Schuster and Dr Berg, they advised transfer to [facility] due to the potential for ruptured ectopic pregnancy  spoke with the patient Lui Acuna to expedite transfer to [facility]                [facility] medical decision making 48-year-old female presents to the emergency department with lower abdominal pain, positive pregnancy test, ultrasound shows free fluid in the abdomen  I spoke with the Obstetrics, they are currently at the [facility] and not coming to the [facility] we expedited transfer for to the patient for Rd, may require diagnostic laparoscopy for probable ruptured ectopic pregnancy  Discussed with obstetric  Discussed with patient and family at length      Disposition  Final diagnoses:   Ectopic pregnancy     Time reflects when diagnosis was documented in both MDM as applicable and the Disposition within this note     Time User Action Codes Description Comment    2/14/2021  2:13 PM Radha Kay Add [O00 90] Ectopic pregnancy       ED Disposition     ED Disposition Condition Date/Time Comment    Transfer to Another Facility-In Network  [facility] Feb 14, 2021  9:39 AM Eva Foy should be transferred out to  [facility]        MD Documentation      Most Recent Value   Patient Condition  The patient has been stabilized such that within reasonable medical probability, no material deterioration of the patient condition or the condition of the unborn child(millie) is likely to result from the transfer   Reason for Transfer  Level of Care needed not available at this facility   Benefits of Transfer  Specialized equipment and/or services available at the receiving facility (Include comment)________________________   Risks of Transfer  Potential for delay in receiving treatment, Other: (Include comment)__________________________   Accepting Physician  [address] Name, Yoel 2    (Name & Tel number)  Patient access   Transported by (Company and Unit #)  [facility]   Sending MD Dr Crystal Reaves   Provider Certification  General risk, such as traffic hazards, adverse weather conditions, rough terrain or turbulence, possible failure of equipment (including vehicle or aircraft), or consequences of actions of persons outside the control of the transport personnel      RN Documentation      Most [address] Name, Yoel 2    (Name & Tel number)  Patient access   Report Given to  Fely Pal RN   [facility]  [facility]      Follow-up Information    None         There are no discharge medications for this patient  No discharge procedures on file      PDMP Review     None          ED Provider  Electronically Signed by           Clay Starr MD  02/14/21 4256

## 2021-02-15 ENCOUNTER — TELEPHONE (OUTPATIENT)
Dept: OBGYN CLINIC | Facility: CLINIC | Age: 31
End: 2021-02-15

## 2021-02-15 VITALS
RESPIRATION RATE: 17 BRPM | DIASTOLIC BLOOD PRESSURE: 67 MMHG | SYSTOLIC BLOOD PRESSURE: 113 MMHG | OXYGEN SATURATION: 94 % | HEART RATE: 97 BPM | TEMPERATURE: 98 F

## 2021-02-15 DIAGNOSIS — Z3A.01 LESS THAN 8 WEEKS GESTATION OF PREGNANCY: Primary | ICD-10-CM

## 2021-02-15 PROBLEM — O36.80X0 PREGNANCY OF UNKNOWN ANATOMIC LOCATION: Status: ACTIVE | Noted: 2021-02-15

## 2021-02-15 LAB
B-HCG SERPL-ACNC: 347 MIU/ML
ERYTHROCYTE [DISTWIDTH] IN BLOOD BY AUTOMATED COUNT: 12.3 % (ref 11.6–15.1)
HCG SERPL QL: POSITIVE
HCT VFR BLD AUTO: 27.3 % (ref 34.8–46.1)
HGB BLD-MCNC: 9 G/DL (ref 11.5–15.4)
MCH RBC QN AUTO: 31.9 PG (ref 26.8–34.3)
MCHC RBC AUTO-ENTMCNC: 33 G/DL (ref 31.4–37.4)
MCV RBC AUTO: 97 FL (ref 82–98)
PLATELET # BLD AUTO: 312 THOUSANDS/UL (ref 149–390)
PMV BLD AUTO: 9.1 FL (ref 8.9–12.7)
RBC # BLD AUTO: 2.82 MILLION/UL (ref 3.81–5.12)
WBC # BLD AUTO: 11.98 THOUSAND/UL (ref 4.31–10.16)

## 2021-02-15 PROCEDURE — 99024 POSTOP FOLLOW-UP VISIT: CPT | Performed by: OBSTETRICS & GYNECOLOGY

## 2021-02-15 PROCEDURE — 84703 CHORIONIC GONADOTROPIN ASSAY: CPT | Performed by: STUDENT IN AN ORGANIZED HEALTH CARE EDUCATION/TRAINING PROGRAM

## 2021-02-15 PROCEDURE — 85027 COMPLETE CBC AUTOMATED: CPT | Performed by: STUDENT IN AN ORGANIZED HEALTH CARE EDUCATION/TRAINING PROGRAM

## 2021-02-15 PROCEDURE — 84702 CHORIONIC GONADOTROPIN TEST: CPT | Performed by: STUDENT IN AN ORGANIZED HEALTH CARE EDUCATION/TRAINING PROGRAM

## 2021-02-15 RX ORDER — IBUPROFEN 600 MG/1
600 TABLET ORAL EVERY 6 HOURS PRN
Qty: 30 TABLET | Refills: 0
Start: 2021-02-15 | End: 2021-12-07

## 2021-02-15 RX ORDER — SIMETHICONE 80 MG
80 TABLET,CHEWABLE ORAL EVERY 6 HOURS PRN
Qty: 30 TABLET | Refills: 0
Start: 2021-02-15 | End: 2021-12-07

## 2021-02-15 RX ORDER — SENNOSIDES 8.6 MG
650 CAPSULE ORAL EVERY 8 HOURS PRN
Qty: 30 TABLET | Refills: 0
Start: 2021-02-15 | End: 2021-12-07

## 2021-02-15 RX ORDER — SIMETHICONE 80 MG
80 TABLET,CHEWABLE ORAL EVERY 6 HOURS PRN
Status: DISCONTINUED | OUTPATIENT
Start: 2021-02-15 | End: 2021-02-15 | Stop reason: HOSPADM

## 2021-02-15 RX ADMIN — SIMETHICONE 80 MG: 80 TABLET, CHEWABLE ORAL at 12:00

## 2021-02-15 RX ADMIN — SODIUM CHLORIDE, SODIUM LACTATE, POTASSIUM CHLORIDE, AND CALCIUM CHLORIDE 125 ML/HR: .6; .31; .03; .02 INJECTION, SOLUTION INTRAVENOUS at 06:10

## 2021-02-15 RX ADMIN — HYDROMORPHONE HYDROCHLORIDE 0.5 MG: 1 INJECTION, SOLUTION INTRAMUSCULAR; INTRAVENOUS; SUBCUTANEOUS at 01:23

## 2021-02-15 RX ADMIN — OXYCODONE HYDROCHLORIDE 10 MG: 10 TABLET ORAL at 00:29

## 2021-02-15 NOTE — TELEPHONE ENCOUNTER
----- Message from Dolores Abarca MD sent at 2/15/2021  1:06 PM EST -----  Regarding: Follow up  This patient has a pregnancy of unknown location and went to the OR for hemoperitoneum which turned out to be coming from the ovary  Her follow up hcg after surgery had fallen from 986 to 347  She is stable and being discharged  She will contact the office for:  1- Repeat hcg at 48 hours  2- Weekly hcg until negative  3- Two week post-op visit with Dr Santos Crenshaw - lives close to the [place] office  Let me know if you have questions        Thanks!!

## 2021-02-15 NOTE — UTILIZATION REVIEW
Initial Clinical Review    Admission: Date/Time/Statement:   Admission Orders (From admission, onward)     Ordered        21 1439  Place in Observation  Once                   Orders Placed This Encounter   Procedures    Place in Observation     Standing Status:   Standing     Number of Occurrences:   1     Order Specific Question:   Level of Care     Answer:   Med Surg [16]     ED Arrival Information     Expected Arrival Acuity Means of Arrival Escorted By Service Admission Type    - 2021 10:41 Emergent [facility] General Medicine Emergency    Arrival Complaint    ectopic        Chief Complaint   Patient presents with    Pregnancy Problem     transfer from [facility] for ectopic pregnancy  c/o lower abdominal pain      Assessment/Plan: 32 yo female with no significant PMH, , recently restarted birth control, LMP pt believes- transferred from [facility] ED to [facility] ER for higher level of care for OB/GYN services on 21  Pt presented to [facility] with mid to low abdom pain that started   TVUS showed no IUP or adnexal mass with moderate complex free fluid with indeterminate heterogenous right adnexal structure, BHCG [id number]  Pt transferred to [facility] ER, admitted as OBS to OB/GYN service with ruptured ectopic pregnancy  On exam, pt has periumbilical and midline tenderness, RLQ>LLQ abdominal tenderness  Plan is for pt to go to  OR 21, IVF, pain control  [email or web address]  Diagnostic LAPAROSCOPIC evacuation of hemoperitoneum  Anesthesia Type:   General  Operative Findings:  300 cc clotted blood surrounding the uterus, mostly under the right ovary and fallopian tube  Slow bleeding from left ovarian cortex  Normal appearing bilateral fallopian tubes  Normal appearing uterus  2/15 /21 POD#1  Hgb decreased from admission 11 9--->9 0  Pt continues with low abdominal pain, if pain is stable and not increasing , pt may be d/c'ed today   Pt tolerating po intake, not passing flatus    Plan is to f/u BHCG in 48 hrs and then weekly until 0  ED Triage Vitals [02/14/21 1047]   Temperature Pulse Respirations Blood Pressure SpO2   97 5 °F (36 4 °C) 88 18 127/73 98 %      Temp Source Heart Rate Source Patient Position - Orthostatic VS BP Location FiO2 (%)   Oral Monitor Lying Right arm --      Pain Score       6          Wt Readings from Last 1 Encounters:   02/14/21 86 2 kg (190 lb)     Additional Vital Signs:   Date/Time  Temp  Pulse  Resp  BP  MAP (mmHg)  SpO2  O2 Flow Rate (L/min)  O2 Device    02/15/21 02:23:26  --  74  --  --  --  98 %  --  --    02/15/21 0223  --  74  16  108/67  81  --  --  --    02/15/21 01:09:23  98 8 °F (37 1 °C)  98  14  108/68  81  94 %  --  --    02/15/21 0109  --  --  --  108/68  --  --  --  --    02/14/21 2136  98 9 °F (37 2 °C)  96  20  105/68  80  92 %  --  --    02/14/21 21:34:51  98 9 °F (37 2 °C)  96  --  105/68  80  91 %  --  --    02/14/21 18:43:25  99 °F (37 2 °C)  115Abnormal   20  101/59  73  94 %  --  --    02/14/21 15:36:49  99 3 °F (37 4 °C)  96  20  119/74  89  90 %  --  --    02/14/21 1534  --  --  --  --  --  --  2 L/min  Nasal cannula    02/14/21 1500  98 2 °F (36 8 °C)  103  18  124/72  --  95 %  2 L/min  Nasal cannula    02/14/21 1445  --  107Abnormal   18  112/66  --  95 %  2 L/min  Nasal cannula    02/14/21 1430  --  106Abnormal   18  120/74  --  94 %  --  None (Room air)    02/14/21 1415  --  108Abnormal   18  119/67  --  97 %  3 L/min  Simple mask    02/14/21 1400  98 6 °F (37 °C)  113Abnormal   19  127/81  --  99 %  --  None (Room air)    02/14/21 1154  98 1 °F (36 7 °C)  89  19  125/72  --  98 %  --  None (Room air)        Pertinent Labs/Diagnostic Test Results:   2/14 US OB transvag  No intrauterine gestation or adnexal mass identified  Moderate complex free fluid with indeterminate heterogeneous right adnexal structure    Differential considerations include ruptured ectopic pregnancy, very early IUP and spontaneous   Correlate with serial quantitative BHCG  Results from last 7 days   Lab Units 02/15/21  0600 21  0726   WBC Thousand/uL 11 98* 15 25*   HEMOGLOBIN g/dL 9 0* 11 9   HEMATOCRIT % 27 3* 35 4   PLATELETS Thousands/uL 312 373   NEUTROS ABS Thousands/µL  --  11 40*         Results from last 7 days   Lab Units 21  0726   SODIUM mmol/L 135*   POTASSIUM mmol/L 3 9   CHLORIDE mmol/L 102   CO2 mmol/L 23   ANION GAP mmol/L 10   BUN mg/dL 9   CREATININE mg/dL 0 66   EGFR ml/min/1 73sq m 118   CALCIUM mg/dL 9 3     Results from last 7 days   Lab Units 21  0726   AST U/L 18   ALT U/L 30   ALK PHOS U/L 55   TOTAL PROTEIN g/dL 7 3   ALBUMIN g/dL 3 6   TOTAL BILIRUBIN mg/dL 0 40   BILIRUBIN DIRECT mg/dL 0 11         Results from last 7 days   Lab Units 21  0726   GLUCOSE RANDOM mg/dL 114               Results from last 7 days   Lab Units 21  0726   LIPASE u/L 103             Results from last 7 days   Lab Units 21  0723   CLARITY UA  Clear   COLOR UA  Light Yellow   SPEC GRAV UA  <=1 005   PH UA  5 5   GLUCOSE UA mg/dl Negative   KETONES UA mg/dl Negative   BLOOD UA  Trace-Intact*   PROTEIN UA mg/dl Negative   NITRITE UA  Negative   BILIRUBIN UA  Negative   UROBILINOGEN UA E U /dl 0 2   LEUKOCYTES UA  Negative   WBC UA /hpf None Seen   RBC UA /hpf 0-1   BACTERIA UA /hpf None Seen   EPITHELIAL CELLS WET PREP /hpf None Seen             History reviewed  No pertinent past medical history        Admitting Diagnosis: Ectopic pregnancy [O00 90]  Ruptured ectopic pregnancy [O00 90]  Age/Sex: 31 y o  female  Admission Orders:  Scheduled Medications:  docusate sodium, 100 mg, Oral, BID      Continuous IV Infusions:  lactated ringers, 125 mL/hr, Intravenous, Continuous  albumin human (FLEXBUMIN) 5 % injection   Freq: Continuous PRN  Last Dose: Stopped (21 1306)    PRN Meds:  HYDROmorphone, 0 5 mg, Intravenous, Q1H PRN  ibuprofen, 600 mg, Oral, Q6H PRN  ondansetron, 4 mg, Intravenous, Q6H PRN  oxyCODONE, 10 mg, Oral, Q4H PRN x1 2/15  oxyCODONE, 5 mg, Oral, Q4H PRN x 1 2/14      SCD's    Network Utilization Review Department  ATTENTION: Please call with any questions or concerns to [phone] and carefully listen to the prompts so that you are directed to the right person  All voicemails are confidential   David Mendez all requests for admission clinical reviews, approved or denied determinations and any other requests to dedicated fax number below belonging to the campus where the patient is receiving treatment   List of dedicated fax numbers for the Facilities:  [address] DENIALS (Administrative/Medical Necessity) [phone]   [address] (Maternity/NICU/Pediatrics) [phone]   [address] Gala Mohr [address] ([address]) [address] [place] [address] Stefan Ritchie [address] [phone]

## 2021-02-15 NOTE — PLAN OF CARE
Problem: ANTEPARTUM  Goal: Maintain pregnancy as long as maternal and/or fetal condition is stable  Description: INTERVENTIONS:  - Maternal surveillance  - Fetal surveillance  - Monitor uterine activity  - Medications as ordered  - Bedrest  Outcome: Progressing     Problem: SAFETY ADULT  Goal: Patient will remain free of falls  Description: INTERVENTIONS:  - Assess patient frequently for physical needs  -  Identify cognitive and physical deficits and behaviors that affect risk of falls    -  [place] fall precautions as indicated by assessment   - Educate patient/family on patient safety including physical limitations  - Instruct patient to call for assistance with activity based on assessment  - Modify environment to reduce risk of injury  - Consider OT/PT consult to assist with strengthening/mobility  Outcome: Progressing  Goal: Maintain or return to baseline ADL function  Description: INTERVENTIONS:  -  Assess patient's ability to carry out ADLs; assess patient's baseline for ADL function and identify physical deficits which impact ability to perform ADLs (bathing, care of mouth/teeth, toileting, grooming, dressing, etc )  - Assess/evaluate cause of self-care deficits   - Assess range of motion  - Assess patient's mobility; develop plan if impaired  - Assess patient's need for assistive devices and provide as appropriate  - Encourage maximum independence but intervene and supervise when necessary  - Involve family in performance of ADLs  - Assess for home care needs following discharge   - Consider OT consult to assist with ADL evaluation and planning for discharge  - Provide patient education as appropriate  Outcome: Progressing  Goal: Maintain or return mobility status to optimal level  Description: INTERVENTIONS:  - Assess patient's baseline mobility status (ambulation, transfers, stairs, etc )    - Identify cognitive and physical deficits and behaviors that affect mobility  - Identify mobility aids required to assist with transfers and/or ambulation (gait belt, sit-to-stand, lift, walker, cane, etc )  - Reserve fall precautions as indicated by assessment  - Record patient progress and toleration of activity level on Mobility SBAR; progress patient to next Phase/Stage  - Instruct patient to call for assistance with activity based on assessment  - Consider rehabilitation consult to assist with strengthening/weightbearing, etc   Outcome: Progressing     Problem: Knowledge Deficit  Goal: Patient/family/caregiver demonstrates understanding of disease process, treatment plan, medications, and discharge instructions  Description: Complete learning assessment and assess knowledge base    Interventions:  - Provide teaching at level of understanding  - Provide teaching via preferred learning methods  Outcome: Progressing

## 2021-02-15 NOTE — TELEPHONE ENCOUNTER
Got a msg from CS on this pt - then got yours  Hers is requesting hcg in 1 week and appt in 1 week  ER with severe pain  Shall I go with yours or hers - 1 week difference with lab and appt  Tiffanie Brown  [birth year]  Pt is doing ok presently  Advil for pain, heating pad  Per ED, go with CS appt and EDs quant  Pt will go for hcg in 48 to 72 hrs

## 2021-02-15 NOTE — TELEPHONE ENCOUNTER
I spoke with ED and pt is to have an hcg in 48 hrs  Slip entered  Pt given a f/u appt in 10 days  I also told her she will be told when to go for another hcg  Pt also aware - any severe pain - to ED  Pt taking motrin and using heating pad presently  Pts dx hemoperitoneum

## 2021-02-15 NOTE — PROGRESS NOTES
Gynecology Progress note   Jolene Hamilton 31 y o  female MRN: [id number]  Unit/Bed#: W -01 Encounter: [id number]    A/P: Jolene Hamilton is a 31 y o  female admitted with hemoperitoneum s/p diagnostic laparoscopic evacuation of hemoperintoneum, fulguration of right ovary, POD#1, stable    1  Pregnancy of unknown location   -beta hcg 986 --> 347   -will follow up beta hcg in 48 hours and then weekly until decreased to 0   -discussed with patient that this is an abnormal pregnancy and not a viable pregnancy   -Hgb 11 9 --> 9 0  2  FEN   -regular diet  3  DVT ppx   -SCDs, ambulatoin  4  Anticipate discharge today after reassessment    Subjective:    No acute events overnight  Patient reports still having lower abdominla pain  Pain is well controlled  Pt is tolerating PO  Pt is not passing flatus  Pt is ambulating  Denies fevers/chills  Pt is able to void  Review of Systems   Constitutional: Negative for chills and fever  Eyes: Negative for visual disturbance  Respiratory: Negative for shortness of breath  Cardiovascular: Negative for chest pain  Gastrointestinal: Negative for constipation, diarrhea, nausea and vomiting  Genitourinary: Negative for pelvic pain, urgency, vaginal bleeding, vaginal discharge and vaginal pain  Neurological: Negative for headaches  /67 (BP Location: Right arm)   Pulse 97   Temp 98 °F (36 7 °C) (Oral)   Resp 17   LMP 02/07/2021   SpO2 94%     I/O last 3 completed shifts: In: 3426 3 [P O :120; I V :2806 3; IV Piggyback:500]  Out: 1700 [Urine:1400; Blood:300]  No intake/output data recorded      Lab Results   Component Value Date    WBC 11 98 (H) 02/15/2021    HGB 9 0 (L) 02/15/2021    HCT 27 3 (L) 02/15/2021    MCV 97 02/15/2021     02/15/2021       Lab Results   Component Value Date    CALCIUM 9 3 02/14/2021    K 3 9 02/14/2021    CO2 23 02/14/2021     02/14/2021    BUN 9 02/14/2021    CREATININE 0 66 02/14/2021       No results found for: POCGLU    Physical Exam  Vitals signs and nursing note reviewed  Constitutional:       Appearance: She is well-developed  Cardiovascular:      Rate and Rhythm: Normal rate and regular rhythm  Pulmonary:      Effort: Pulmonary effort is normal       Breath sounds: Normal breath sounds  Abdominal:      General: There is no distension  Palpations: Abdomen is soft  Tenderness: There is no abdominal tenderness  There is no guarding or rebound  Comments: Incisions are c/d/i   Musculoskeletal: Normal range of motion  General: No tenderness  Neurological:      Mental Status: She is alert and oriented to person, place, and time           Sally Rodriguez MD   PGY-4 OB/GYN  2/15/2021 9:00 AM

## 2021-02-15 NOTE — DISCHARGE INSTRUCTIONS
Exploratory Laparoscopy   WHAT YOU NEED TO KNOW:   Exploratory laparoscopy is surgery to look for causes of pain, abnormal growths, bleeding, or disease in your abdomen  During this surgery, small incisions are made in your abdomen  A small scope and tools are inserted through these incisions  A scope is a flexible tube with a light and camera on the end  DISCHARGE INSTRUCTIONS:   Medicines:   · Antibiotics: This medicine is given to fight or prevent an infection caused by bacteria  · Pain medicine: You may be given a prescription medicine to decrease pain  Do not wait until the pain is severe before you take this medicine  · Take your medicine as directed  Contact your healthcare provider if you think your medicine is not helping or if you have side effects  Tell him or her if you are allergic to any medicine  Keep a list of the medicines, vitamins, and herbs you take  Include the amounts, and when and why you take them  Bring the list or the pill bottles to follow-up visits  Carry your medicine list with you in case of an emergency  Follow up with your healthcare provider or surgeon as directed: You may need to return to have your stitches removed  Write down your questions so you remember to ask them during your visits  Wound care:   · Follow your healthcare provider or surgeon's instructions: You may need to keep the bandages on your incisions for 1 to 2 days or until your follow-up visit  After your follow-up visit, you may need to change your bandage 1 to 2 times a day  · Wash your hands:  Use soap and warm water to wash your hands  Do this before and after you care for your wound  Hand washing helps prevent an infection  · Remove your bandages gently:  If the bandage sticks to your wound, use warm water on the bandage and lift it off slowly  Lift the edges toward the center of your wound  Carefully wash around the wound with soap and water   Try not to get soap and water directly on your wound  Dry the area and put on new, clean bandages as directed  Change your bandages when they get wet or dirty  Ask how to clean your wounds after your stitches are removed  Self-care:   · Pain:  You may have neck or shoulder pain from gas used during your surgery  Rest and use heat on your shoulder to help decrease the pain  You may be more comfortable if you elevate your head and shoulders on several pillows  · Rest:  You may feel like resting more after your surgery  Slowly start to do more each day  Rest when you feel it is needed  · Prevent constipation:  High-fiber foods, extra liquids, and regular exercise can help you prevent constipation  Examples of high-fiber foods are fruit and bran  Prune juice and water are good liquids to drink  Regular exercise helps your digestive system work  You may also be told to take over-the-counter fiber and stool softener medicines  Take these items as directed  · Vaginal bleeding: You may have vaginal bleeding for a day or two if your laparoscopy was done for a female problem  Ask when you can bathe:  Ask your healthcare provider when you can take a shower or bath  Contact your healthcare provider or surgeon if:   · You have a fever  · You have pain in your abdomen or shoulder that does not go away after 2 days or gets worse  · You have more vaginal bleeding or discharge than you expected  · You have trouble having a bowel movement, or have diarrhea often  · You have repeated vomiting  · You have chills, a cough, or feel weak and achy  · Your stitches are swollen, red, or have pus coming from them  · You have questions or concerns about your condition or care  Seek care immediately or call 911 if:   · Your incisions come apart  · Your incisions bleed, or blood soaks through your bandage  · Your arm or leg feels warm, tender, and painful  It may look swollen and red      · You suddenly feel lightheaded and short of breath  · You have chest pain when you take a deep breath or cough  You may cough up blood  © Copyright [employer] 2018 Information is for End User's use only and may not be sold, redistributed or otherwise used for commercial purposes  All illustrations and images included in CareNotes® are the copyrighted property of [employer]  or [employer]   The above information is an  only  It is not intended as medical advice for individual conditions or treatments  Talk to your doctor, nurse or pharmacist before following any medical regimen to see if it is safe and effective for you

## 2021-02-15 NOTE — TELEPHONE ENCOUNTER
----- Message from Valeri Gutierrez MD sent at 2/15/2021 12:51 PM EST -----  Regarding: f/u surgery  Hi - Ramon Carrasco had surgery yesterday  She should have hcg in 1 wk and f/u with me in a week as well  Can you please order her hcg and schedule f/u with me in [place]? She has pregnancy of unknown location - (though hcg decreasing and may be resolving on its own) please  that she should call or go to ED with any severe pain

## 2021-02-16 LAB
ABO GROUP BLD BPU: NORMAL
ABO GROUP BLD BPU: NORMAL
BPU ID: NORMAL
BPU ID: NORMAL
CROSSMATCH: NORMAL
CROSSMATCH: NORMAL
UNIT DISPENSE STATUS: NORMAL
UNIT DISPENSE STATUS: NORMAL
UNIT PRODUCT CODE: NORMAL
UNIT PRODUCT CODE: NORMAL
UNIT RH: NORMAL
UNIT RH: NORMAL

## 2021-02-18 NOTE — TELEPHONE ENCOUNTER
Patient called and said she was returning a call about her surgery results  She would like someone to call her and discuss those with her

## 2021-02-18 NOTE — TELEPHONE ENCOUNTER
Pt said you called her twice to discuss results of surgery  She will be home the rest of the day   # jf [phone]   Thanks

## 2021-02-24 ENCOUNTER — OFFICE VISIT (OUTPATIENT)
Dept: OBGYN CLINIC | Facility: CLINIC | Age: 31
End: 2021-02-24
Payer: COMMERCIAL

## 2021-02-24 VITALS — BODY MASS INDEX: 36.4 KG/M2 | DIASTOLIC BLOOD PRESSURE: 72 MMHG | WEIGHT: 199 LBS | SYSTOLIC BLOOD PRESSURE: 128 MMHG

## 2021-02-24 DIAGNOSIS — Z32.02 NEGATIVE PREGNANCY TEST: ICD-10-CM

## 2021-02-24 DIAGNOSIS — O00.201 RIGHT OVARIAN PREGNANCY WITHOUT INTRAUTERINE PREGNANCY: Primary | ICD-10-CM

## 2021-02-24 DIAGNOSIS — Z30.011 ENCOUNTER FOR INITIAL PRESCRIPTION OF CONTRACEPTIVE PILLS: ICD-10-CM

## 2021-02-24 LAB — SL AMB POCT URINE HCG: NORMAL

## 2021-02-24 PROCEDURE — 99213 OFFICE O/P EST LOW 20 MIN: CPT | Performed by: STUDENT IN AN ORGANIZED HEALTH CARE EDUCATION/TRAINING PROGRAM

## 2021-02-24 PROCEDURE — 81025 URINE PREGNANCY TEST: CPT | Performed by: STUDENT IN AN ORGANIZED HEALTH CARE EDUCATION/TRAINING PROGRAM

## 2021-02-24 RX ORDER — NORGESTIMATE AND ETHINYL ESTRADIOL 7DAYSX3 28
1 KIT ORAL DAILY
Qty: 84 TABLET | Refills: 3 | Status: SHIPPED | OUTPATIENT
Start: 2021-02-24 | End: 2021-12-07

## 2021-02-24 NOTE — PROGRESS NOTES
Assessment/Plan:       30 yo  -  Return for annual exam     Right ovarian pregnancy without intrauterine pregnancy  Resolved - negative UPT today  We discussed small risk of repeat ectopic pregnancy in the future, though ovarian ectopic is such a rare event it is unlikely to repeat  Contraception:   Would like OCP  No contraindications  Encouraged to quit smoking  Aware of increased risks w/ OCP and smoking  Discussed that it can take up to 6 months for regular menses to return after stopping the pill  She and partner would like to try for pregnancy after marriage  Negative pregnancy test  -     POCT urine HCG          Subjective:     Patient ID: Elysia Lira is a 31 y o  female  30 yo  presents as follow up for ectopic pregnancy  Marylu Herman presented on  with abdominal pain, + hcg, and free fluid on US  She was taken for diagnostic laparoscopy and bleeding was found to be coming from her right ovary  Evacuation of hemoperitoneum was performed and ovary was cauterized  Tissue/clot sent for pathology returned showing chorionic villi  Since that time pt reports intermittent cramping, some spotting which is resolving  No other concerns  She has questions about fertility in the future  She and her partner are engaged and she would like to go back on OCP  No h/o HTN, liver disease, breast cancer, migraines or VTE  She is a smoker  Review of Systems   All other systems reviewed and are negative  Objective:     Physical Exam  Vitals signs reviewed  Pulmonary:      Effort: Pulmonary effort is normal    Neurological:      Mental Status: She is alert and oriented to person, place, and time     Psychiatric:         Behavior: Behavior normal

## 2021-11-03 ENCOUNTER — OFFICE VISIT (OUTPATIENT)
Dept: OBGYN CLINIC | Facility: CLINIC | Age: 31
End: 2021-11-03
Payer: COMMERCIAL

## 2021-11-03 VITALS
WEIGHT: 201.6 LBS | HEIGHT: 62 IN | BODY MASS INDEX: 37.1 KG/M2 | SYSTOLIC BLOOD PRESSURE: 120 MMHG | DIASTOLIC BLOOD PRESSURE: 74 MMHG

## 2021-11-03 DIAGNOSIS — Z71.3 ENCOUNTER FOR NUTRITIONAL COUNSELING: ICD-10-CM

## 2021-11-03 DIAGNOSIS — Z01.419 ENCOUNTER FOR ANNUAL ROUTINE GYNECOLOGICAL EXAMINATION: Primary | ICD-10-CM

## 2021-11-03 PROCEDURE — G0476 HPV COMBO ASSAY CA SCREEN: HCPCS | Performed by: STUDENT IN AN ORGANIZED HEALTH CARE EDUCATION/TRAINING PROGRAM

## 2021-11-03 PROCEDURE — 99395 PREV VISIT EST AGE 18-39: CPT | Performed by: STUDENT IN AN ORGANIZED HEALTH CARE EDUCATION/TRAINING PROGRAM

## 2021-11-03 PROCEDURE — G0145 SCR C/V CYTO,THINLAYER,RESCR: HCPCS | Performed by: PATHOLOGY

## 2021-11-03 PROCEDURE — G0124 SCREEN C/V THIN LAYER BY MD: HCPCS | Performed by: PATHOLOGY

## 2021-11-05 LAB
HPV HR 12 DNA CVX QL NAA+PROBE: POSITIVE
HPV16 DNA CVX QL NAA+PROBE: NEGATIVE
HPV18 DNA CVX QL NAA+PROBE: NEGATIVE

## 2021-11-10 LAB
LAB AP GYN PRIMARY INTERPRETATION: ABNORMAL
Lab: ABNORMAL
PATH INTERP SPEC-IMP: ABNORMAL

## 2021-11-11 ENCOUNTER — TELEPHONE (OUTPATIENT)
Dept: OBGYN CLINIC | Facility: CLINIC | Age: 31
End: 2021-11-11

## 2021-12-07 ENCOUNTER — PROCEDURE VISIT (OUTPATIENT)
Dept: OBGYN CLINIC | Age: 31
End: 2021-12-07
Payer: COMMERCIAL

## 2021-12-07 VITALS — BODY MASS INDEX: 37.09 KG/M2 | DIASTOLIC BLOOD PRESSURE: 80 MMHG | SYSTOLIC BLOOD PRESSURE: 122 MMHG | WEIGHT: 202.8 LBS

## 2021-12-07 DIAGNOSIS — R87.810 ASCUS WITH POSITIVE HIGH RISK HPV CERVICAL: Primary | ICD-10-CM

## 2021-12-07 DIAGNOSIS — Z32.02 PREGNANCY TEST NEGATIVE: ICD-10-CM

## 2021-12-07 DIAGNOSIS — R87.610 ASCUS WITH POSITIVE HIGH RISK HPV CERVICAL: Primary | ICD-10-CM

## 2021-12-07 LAB — SL AMB POCT URINE HCG: NORMAL

## 2021-12-07 PROCEDURE — 81025 URINE PREGNANCY TEST: CPT | Performed by: STUDENT IN AN ORGANIZED HEALTH CARE EDUCATION/TRAINING PROGRAM

## 2021-12-07 PROCEDURE — 88305 TISSUE EXAM BY PATHOLOGIST: CPT | Performed by: PATHOLOGY

## 2021-12-07 PROCEDURE — 57454 BX/CURETT OF CERVIX W/SCOPE: CPT | Performed by: STUDENT IN AN ORGANIZED HEALTH CARE EDUCATION/TRAINING PROGRAM

## 2022-01-01 NOTE — PLAN OF CARE
Problem: ANTEPARTUM  Goal: Maintain pregnancy as long as maternal and/or fetal condition is stable  Description: INTERVENTIONS:  - Maternal surveillance  - Fetal surveillance  - Monitor uterine activity  - Medications as ordered  - Bedrest  Outcome: Adequate for Discharge     Problem: PAIN - ADULT  Goal: Verbalizes/displays adequate comfort level or baseline comfort level  Description: Interventions:  - Encourage patient to monitor pain and request assistance  - Assess pain using appropriate pain scale  - Administer analgesics based on type and severity of pain and evaluate response  - Implement non-pharmacological measures as appropriate and evaluate response  - Consider cultural and social influences on pain and pain management  - Notify physician/advanced practitioner if interventions unsuccessful or patient reports new pain  Outcome: Adequate for Discharge     Problem: SAFETY ADULT  Goal: Patient will remain free of falls  Description: INTERVENTIONS:  - Assess patient frequently for physical needs  -  Identify cognitive and physical deficits and behaviors that affect risk of falls    -  [place] fall precautions as indicated by assessment   - Educate patient/family on patient safety including physical limitations  - Instruct patient to call for assistance with activity based on assessment  - Modify environment to reduce risk of injury  - Consider OT/PT consult to assist with strengthening/mobility  Outcome: Adequate for Discharge  Goal: Maintain or return to baseline ADL function  Description: INTERVENTIONS:  -  Assess patient's ability to carry out ADLs; assess patient's baseline for ADL function and identify physical deficits which impact ability to perform ADLs (bathing, care of mouth/teeth, toileting, grooming, dressing, etc )  - Assess/evaluate cause of self-care deficits   - Assess range of motion  - Assess patient's mobility; develop plan if impaired  - Assess patient's need for assistive devices and Problem: Thermoregulation - [place]/Pediatrics  Goal: Maintains normal body temperature  Outcome: Progressing     Problem: Safety - [place]  Goal: Free from fall injury  Outcome: Progressing     Problem: Normal [place]  Goal: [place] experiences normal transition  Outcome: Progressing  Goal: Total Weight Loss Less than 10% of birth weight  Outcome: Progressing  Flowsheets (Taken 2022 0899)  Total Weight Loss Less Than 10% of Birth Weight:   Assess feeding patterns   Weigh daily  Note: Mother is breast pumping, breast feeding, and supplementing with formula. When Primary RN assessed infant, Primary RN fed infant 30 mls of formula with curved tip syringe. Mother pumped all night throughout the night along with breast feeding. Mother is getting 30mls of breast milk when she pumps. Infant's feedings have increased all shift long.      Problem: Discharge Planning  Goal: Discharge to home or other facility with appropriate resources  Outcome: Progressing provide as appropriate  - Encourage maximum independence but intervene and supervise when necessary  - Involve family in performance of ADLs  - Assess for home care needs following discharge   - Consider OT consult to assist with ADL evaluation and planning for discharge  - Provide patient education as appropriate  Outcome: Adequate for Discharge  Goal: Maintain or return mobility status to optimal level  Description: INTERVENTIONS:  - Assess patient's baseline mobility status (ambulation, transfers, stairs, etc )    - Identify cognitive and physical deficits and behaviors that affect mobility  - Identify mobility aids required to assist with transfers and/or ambulation (gait belt, sit-to-stand, lift, walker, cane, etc )  - [place] fall precautions as indicated by assessment  - Record patient progress and toleration of activity level on Mobility SBAR; progress patient to next Phase/Stage  - Instruct patient to call for assistance with activity based on assessment  - Consider rehabilitation consult to assist with strengthening/weightbearing, etc   Outcome: Adequate for Discharge     Problem: DISCHARGE PLANNING  Goal: Discharge to home or other facility with appropriate resources  Description: INTERVENTIONS:  - Identify barriers to discharge w/patient and caregiver  - Arrange for needed discharge resources and transportation as appropriate  - Identify discharge learning needs (meds, wound care, etc )  - Arrange for interpretive services to assist at discharge as needed  - Refer to Case Management Department for coordinating discharge planning if the patient needs post-hospital services based on physician/advanced practitioner order or complex needs related to functional status, cognitive ability, or social support system  Outcome: Adequate for Discharge     Problem: Knowledge Deficit  Goal: Patient/family/caregiver demonstrates understanding of disease process, treatment plan, medications, and discharge instructions  Description: Complete learning assessment and assess knowledge base    Interventions:  - Provide teaching at level of understanding  - Provide teaching via preferred learning methods  Outcome: Adequate for Discharge

## 2022-06-23 ENCOUNTER — OFFICE VISIT (OUTPATIENT)
Dept: FAMILY MEDICINE CLINIC | Facility: CLINIC | Age: 32
End: 2022-06-23
Payer: COMMERCIAL

## 2022-06-23 VITALS
SYSTOLIC BLOOD PRESSURE: 122 MMHG | HEIGHT: 62 IN | WEIGHT: 212 LBS | OXYGEN SATURATION: 99 % | HEART RATE: 102 BPM | DIASTOLIC BLOOD PRESSURE: 80 MMHG | TEMPERATURE: 97.9 F | BODY MASS INDEX: 39.01 KG/M2

## 2022-06-23 DIAGNOSIS — Z13.220 SCREENING FOR LIPID DISORDERS: ICD-10-CM

## 2022-06-23 DIAGNOSIS — Z13.1 SCREENING FOR DIABETES MELLITUS: ICD-10-CM

## 2022-06-23 DIAGNOSIS — E66.1 CLASS 2 DRUG-INDUCED OBESITY WITHOUT SERIOUS COMORBIDITY WITH BODY MASS INDEX (BMI) OF 38.0 TO 38.9 IN ADULT: ICD-10-CM

## 2022-06-23 DIAGNOSIS — R06.83 SNORING: ICD-10-CM

## 2022-06-23 DIAGNOSIS — R53.82 CHRONIC FATIGUE: Primary | ICD-10-CM

## 2022-06-23 PROBLEM — O36.80X0 PREGNANCY OF UNKNOWN ANATOMIC LOCATION: Status: RESOLVED | Noted: 2021-02-15 | Resolved: 2022-06-23

## 2022-06-23 PROCEDURE — 99204 OFFICE O/P NEW MOD 45 MIN: CPT | Performed by: NURSE PRACTITIONER

## 2022-06-23 PROCEDURE — 1036F TOBACCO NON-USER: CPT | Performed by: NURSE PRACTITIONER

## 2022-06-23 PROCEDURE — 3725F SCREEN DEPRESSION PERFORMED: CPT | Performed by: NURSE PRACTITIONER

## 2022-06-23 PROCEDURE — 3008F BODY MASS INDEX DOCD: CPT | Performed by: NURSE PRACTITIONER

## 2022-06-23 NOTE — PROGRESS NOTES
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    Chronic fatigue - Primary     DW patient will update labs and sleep study            Relevant Orders    Comprehensive metabolic panel    TSH, 3rd generation with Free T4 reflex    CBC and differential    HEMOGLOBIN A1C W/ EAG ESTIMATION    Snoring    Relevant Orders    Ambulatory Referral to Sleep Medicine    Screening for diabetes mellitus    Relevant Orders    HEMOGLOBIN A1C W/ EAG ESTIMATION    Screening for lipid disorders    Relevant Orders    Lipid Panel with Direct LDL reflex            Subjective:      Patient ID: Regan Mckeon is a 27 y o  female  Patient here to establish care and reports that she has troubles with her weight and is trying to lose weight and is gradual in nature and weight she was comfortable at was 170 lbs  Patient has been trying to eat well and admits to eat poor habits and is an emotionally eater  Patient has a medical history of ectopic a year ago and had surgery and is doing well  Patient is not currently on birth control and is considering pregnancy in the future  Patient is following with GYN and seeing yearly  Patient family history of Alzheimer's  The following portions of the patient's history were reviewed and updated as appropriate:   She  has no past medical history on file  She   Patient Active Problem List    Diagnosis Date Noted    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 06/23/2022    Chronic fatigue 06/23/2022    Snoring 06/23/2022    Screening for diabetes mellitus 06/23/2022    Screening for lipid disorders 06/23/2022     She  has a past surgical history that includes pr lap,rmv  adnexal structure (N/A, 2/14/2021)  Her family history is not on file  She  reports that she has quit smoking   Her smoking use included cigarettes  She has a 2 50 pack-year smoking history  She has never used smokeless tobacco  She reports previous alcohol use  She reports previous drug use  No current outpatient medications on file  No current facility-administered medications for this visit  She has No Known Allergies       Review of Systems   Constitutional: Positive for fatigue and unexpected weight change  Negative for activity change, appetite change, chills, diaphoresis and fever  HENT: Negative for congestion, ear pain, hearing loss, postnasal drip, sinus pressure, sinus pain, sneezing, sore throat, trouble swallowing and voice change  Eyes: Negative for pain, redness and visual disturbance  Due for eye exam   Respiratory: Negative for cough and shortness of breath  History of smoking and quit 1 month ago    Cardiovascular: Negative for chest pain, palpitations and leg swelling  Gastrointestinal: Negative for abdominal distention, abdominal pain, anal bleeding, blood in stool, constipation, diarrhea, nausea, rectal pain and vomiting  Endocrine: Negative  Genitourinary: Negative  Has regular cycle lasting 5 days and regular    Musculoskeletal: Negative for arthralgias, back pain, gait problem, joint swelling, myalgias, neck pain and neck stiffness  Skin: Negative  Allergic/Immunologic: Negative for environmental allergies, food allergies and immunocompromised state  Neurological: Negative for dizziness, tremors, seizures, syncope, speech difficulty, weakness, light-headedness, numbness and headaches  Hematological: Negative  Psychiatric/Behavioral: Negative for agitation, behavioral problems, confusion, decreased concentration, dysphoric mood, hallucinations, self-injury, sleep disturbance and suicidal ideas  The patient is not nervous/anxious and is not hyperactive  Objective:  BMI Counseling: Body mass index is 38 78 kg/m²   The BMI is above normal  Nutrition recommendations include decreasing portion sizes, encouraging healthy choices of fruits and vegetables, decreasing fast food intake, consuming healthier snacks, limiting drinks that contain sugar, moderation in carbohydrate intake, increasing intake of lean protein, reducing intake of saturated and trans fat and reducing intake of cholesterol  Exercise recommendations include moderate physical activity 150 minutes/week  No pharmacotherapy was ordered  Patient referred to PCP  Rationale for BMI follow-up plan is due to patient being overweight or obese  Depression Screening and Follow-up Plan: Patient was screened for depression during today's encounter  They screened negative with a PHQ-2 score of 0       /80 (BP Location: Left arm, Patient Position: Sitting)   Pulse 102   Temp 97 9 °F (36 6 °C) (Temporal)   Ht 5' 2" (1 575 m)   Wt 96 2 kg (212 lb)   SpO2 99%   BMI 38 78 kg/m²          Physical Exam  Vitals and nursing note reviewed  Constitutional:       General: She is not in acute distress  Appearance: She is well-developed  She is obese  HENT:      Head: Normocephalic and atraumatic  Right Ear: Tympanic membrane and ear canal normal       Left Ear: Tympanic membrane and ear canal normal       Nose: Nose normal       Mouth/Throat:      Mouth: Mucous membranes are moist    Eyes:      Pupils: Pupils are equal, round, and reactive to light  Neck:      Thyroid: No thyromegaly  Cardiovascular:      Rate and Rhythm: Normal rate and regular rhythm  Heart sounds: Normal heart sounds  No murmur heard  Pulmonary:      Effort: Pulmonary effort is normal  No respiratory distress  Breath sounds: Normal breath sounds  No wheezing  Abdominal:      General: Bowel sounds are normal       Palpations: Abdomen is soft  Musculoskeletal:         General: Normal range of motion  Cervical back: Normal range of motion  Skin:     General: Skin is warm and dry  Neurological:      General: No focal deficit present  Mental Status: She is alert and oriented to person, place, and time  Cranial Nerves: Cranial nerves are intact  Sensory: Sensation is intact  Motor: Motor function is intact  Psychiatric:         Attention and Perception: Attention normal          Mood and Affect: Mood normal          Speech: Speech normal          Behavior: Behavior normal          Thought Content:  Thought content normal          Judgment: Judgment normal

## 2022-10-10 ENCOUNTER — TELEPHONE (OUTPATIENT)
Dept: OBGYN CLINIC | Facility: CLINIC | Age: 32
End: 2022-10-10

## 2022-10-10 NOTE — TELEPHONE ENCOUNTER
----- Message from Marily Alcantara sent at 10/7/2022  6:05 PM EDT -----  Regarding: Spotting before Cycle  Jurgen Menon,      I hope you are having a good day! I’m experiencing some bright red spotting and slight cramping, it started this morning  My period isn’t due until around the week of the Oct  16th  I’m a bit worried because I had an ectopic pregnancy about two years ago and I’m afraid it may be happening again  Is this something I should be worried about and set up an appointment for ASAP? Or should I wait a few days?       Thank you,    Marily Alcantara

## 2022-10-11 PROBLEM — Z13.1 SCREENING FOR DIABETES MELLITUS: Status: RESOLVED | Noted: 2022-06-23 | Resolved: 2022-10-11

## 2022-10-11 PROBLEM — Z13.220 SCREENING FOR LIPID DISORDERS: Status: RESOLVED | Noted: 2022-06-23 | Resolved: 2022-10-11

## 2023-05-26 ENCOUNTER — OFFICE VISIT (OUTPATIENT)
Dept: FAMILY MEDICINE CLINIC | Facility: CLINIC | Age: 33
End: 2023-05-26

## 2023-05-26 VITALS
WEIGHT: 210.2 LBS | HEART RATE: 102 BPM | DIASTOLIC BLOOD PRESSURE: 82 MMHG | OXYGEN SATURATION: 98 % | BODY MASS INDEX: 38.68 KG/M2 | TEMPERATURE: 97.9 F | HEIGHT: 62 IN | SYSTOLIC BLOOD PRESSURE: 124 MMHG

## 2023-05-26 DIAGNOSIS — F32.2 CURRENT SEVERE EPISODE OF MAJOR DEPRESSIVE DISORDER WITHOUT PSYCHOTIC FEATURES WITHOUT PRIOR EPISODE (HCC): Primary | ICD-10-CM

## 2023-05-26 RX ORDER — ESCITALOPRAM OXALATE 5 MG/1
5 TABLET ORAL DAILY
Qty: 30 TABLET | Refills: 2 | Status: SHIPPED | OUTPATIENT
Start: 2023-05-26 | End: 2023-08-24

## 2023-05-26 RX ORDER — HYDROXYZINE PAMOATE 25 MG/1
25 CAPSULE ORAL 3 TIMES DAILY PRN
Qty: 30 CAPSULE | Refills: 0 | Status: SHIPPED | OUTPATIENT
Start: 2023-05-26 | End: 2023-06-05

## 2023-05-26 NOTE — PROGRESS NOTES
Name: Jackelyn Rivera      : 1990      MRN: [id number]  Encounter Provider: GLENNA Frey  Encounter Date: 2023   Encounter department: [address] [place] [address]     1  Current severe episode of major depressive disorder without psychotic features without prior episode [facility])  Assessment & Plan: Will order a low dose of Lexapro 5 mg and also prn Vistaril and will refer to therapy as well     Orders:  -     Ambulatory Referral to [facility] Therapists; Future  -     escitalopram (LEXAPRO) 5 mg tablet; Take 1 tablet (5 mg total) by mouth daily  -     hydrOXYzine pamoate (VISTARIL) 25 mg capsule; Take 1 capsule (25 mg total) by mouth 3 (three) times a day as needed for anxiety for up to 10 days           Subjective      Patient here today and reports that her granddad has Alzheimer's and she sees him once a week and is struggling with guilt and also she is having anxiety she was raised by him  No other things is triggering her anxiety  Patient had taken trazodone in the past and was effective for sleep  Review of Systems   Constitutional: Negative for activity change, appetite change, chills, diaphoresis, fatigue, fever and unexpected weight change  HENT: Negative for congestion, ear pain, hearing loss, postnasal drip, sinus pressure, sinus pain, sneezing and sore throat  Eyes: Negative for pain, redness and visual disturbance  Respiratory: Negative for cough and shortness of breath  Cardiovascular: Negative for chest pain and leg swelling  Gastrointestinal: Negative for abdominal pain, diarrhea, nausea and vomiting  Endocrine: Negative  Genitourinary: Negative  Musculoskeletal: Negative for arthralgias  Skin: Negative  Allergic/Immunologic: Negative  Neurological: Negative for dizziness and light-headedness  Hematological: Negative  Psychiatric/Behavioral: Negative for behavioral problems and dysphoric mood   The patient is "nervous/anxious  No current outpatient medications on file prior to visit  Objective     /82 (BP Location: Left arm, Patient Position: Sitting)   Pulse 102   Temp 97 9 °F (36 6 °C) (Temporal)   Ht 5' 2\" (1 575 m)   Wt 95 3 kg (210 lb 3 2 oz)   SpO2 98%   BMI 38 45 kg/m²     Physical Exam  Vitals and nursing note reviewed  Constitutional:       General: She is not in acute distress  Appearance: She is well-developed  HENT:      Head: Normocephalic and atraumatic  Right Ear: Tympanic membrane normal       Left Ear: Tympanic membrane normal       Nose: Nose normal       Mouth/Throat:      Mouth: Mucous membranes are moist    Eyes:      Pupils: Pupils are equal, round, and reactive to light  Neck:      Thyroid: No thyromegaly  Cardiovascular:      Rate and Rhythm: Normal rate and regular rhythm  Heart sounds: Normal heart sounds  No murmur heard  Pulmonary:      Effort: Pulmonary effort is normal  No respiratory distress  Breath sounds: Normal breath sounds  No wheezing  Abdominal:      General: Bowel sounds are normal       Palpations: Abdomen is soft  Musculoskeletal:         General: Normal range of motion  Cervical back: Normal range of motion  Skin:     General: Skin is warm and dry  Neurological:      General: No focal deficit present  Mental Status: She is alert and oriented to person, place, and time  Psychiatric:         Attention and Perception: Attention normal          Mood and Affect: Mood is anxious  Speech: Speech normal          Behavior: Behavior normal          Thought Content: Thought content normal          Cognition and Memory: Cognition and memory normal          Judgment: Judgment normal      Depression Screening Follow-up Plan: Patient's depression screening was positive with a PHQ-2 score of 4  Their PHQ-9 score was 16  Patient assessed for underlying major depression  They have no active suicidal ideations   Brief " counseling provided and recommend additional follow-up/re-evaluation next office visit  PHQ-2/9 Depression Screening    Little interest or pleasure in doing things: 2 - more than half the days  Feeling down, depressed, or hopeless: 2 - more than half the days  Trouble falling or staying asleep, or sleeping too much: 3 - nearly every day  Feeling tired or having little energy: 3 - nearly every day  Poor appetite or overeating: 3 - nearly every day  Feeling bad about yourself - or that you are a failure or have let yourself or your family down: 1 - several days  Trouble concentrating on things, such as reading the newspaper or watching television: 1 - several days  Moving or speaking so slowly that other people could have noticed  Or the opposite - being so fidgety or restless that you have been moving around a lot more than usual: 1 - several days  Thoughts that you would be better off dead, or of hurting yourself in some way: 0 - not at all  PHQ-2 Score: 4  PHQ-2 Interpretation: POSITIVE depression screen  PHQ-9 Score: 16   PHQ-9 Interpretation: Moderately severe depression        GABBY-7 Flowsheet Screening    Flowsheet Row Most Recent Value   Over the last 2 weeks, how often have you been bothered by any of the following problems? Feeling nervous, anxious, or on edge 2   Not being able to stop or control worrying 2   Worrying too much about different things 2   Trouble relaxing 2   Being so restless that it is hard to sit still 2   Becoming easily annoyed or irritable 2   Feeling afraid as if something awful might happen 0   GABBY-7 Total Score 12        Alto GLENNA Flores  Depression Screening Follow-up Plan: Patient's depression screening was positive with a PHQ-2 score of 4  Their PHQ-9 score was 16  Patient assessed for underlying major depression  They have no active suicidal ideations  Brief counseling provided and recommend additional follow-up/re-evaluation next office visit

## 2023-06-01 ENCOUNTER — TELEPHONE (OUTPATIENT)
Dept: PSYCHIATRY | Facility: CLINIC | Age: 33
End: 2023-06-01

## 2023-08-17 ENCOUNTER — ULTRASOUND (OUTPATIENT)
Dept: OBGYN CLINIC | Facility: MEDICAL CENTER | Age: 33
End: 2023-08-17
Payer: COMMERCIAL

## 2023-08-17 VITALS — DIASTOLIC BLOOD PRESSURE: 88 MMHG | SYSTOLIC BLOOD PRESSURE: 122 MMHG | WEIGHT: 216 LBS | BODY MASS INDEX: 39.51 KG/M2

## 2023-08-17 DIAGNOSIS — N91.2 AMENORRHEA: Primary | ICD-10-CM

## 2023-08-17 DIAGNOSIS — Z3A.01 7 WEEKS GESTATION OF PREGNANCY: ICD-10-CM

## 2023-08-17 DIAGNOSIS — Z36.89 ENCOUNTER TO ESTABLISH GESTATIONAL AGE USING ULTRASOUND: ICD-10-CM

## 2023-08-17 PROCEDURE — 99213 OFFICE O/P EST LOW 20 MIN: CPT | Performed by: NURSE PRACTITIONER

## 2023-08-17 NOTE — PROGRESS NOTES
Subjective  Patient ID: Zaheer Yancey is a 34 y.o. female here for Pregnancy Ultrasound (LMP 23/EGA 7w5d/NHI 3/30/24/Damien surprise /)    Newly Pregnant and accompanied by  John   Patient's last menstrual period was 2023 (exact date). giving her an NHI of 3/30/24 and a gestational age of  7 weeks 5 days (based on LMP)    Menstrual cycle: regular, cycle length:  28-30 days  Pregnancy was planned. She has started taking a prenatal vitamin    She is C/O amenorrhea  Signs and symptoms of pregnancy:   • Breast tenderness: no  • Fatigue: yes  • Cramping or Pelvic Pain: no  • Spotting or Vaginal Bleeding: no  • Nausea or vomiting: nausea without vomiting    OB History    Para Term  AB Living   2 0     1 0   SAB IAB Ectopic Multiple Live Births                  # Outcome Date GA Lbr Joaquin/2nd Weight Sex Delivery Anes PTL Lv   2 AB            1                  The following portions of the patient's history were reviewed and updated as appropriate: allergies, current medications, past family history, past medical history, past social history, past surgical history, and problem list.    Perinent hx that may affect pregnancy:  •       Review of Systems    See HPI for pertinent positives.              /88 (BP Location: Left arm, Patient Position: Sitting, Cuff Size: Large)   Wt 98 kg (216 lb)   LMP 2023 (Exact Date)   BMI 39.51 kg/m²   OBGyn Exam  Results for orders placed or performed in visit on 21   POCT urine HCG   Result Value Ref Range    URINE HCG neg    Tissue Exam   Result Value Ref Range    Case Report       Surgical Pathology Report                         Case: [id number]                                   Authorizing Provider:  Pearl Allen MD       Collected:           2021 1010              Ordering Location:     [facility]      Received:            2021 [address] [place] [address] Ezra                                                                  Pathologist:           Lorane Holter, MD                                                         Specimens:   A) - Cervix, 6                                                                                      B) - Cervix, 12                                                                                     C) - Endocervical, ECC                                                                     Final Diagnosis       A. Cervix biopsy, 06:00 o'clock:  - Koilocytotic atypia in exocervical mucosa, can not entirely exclude low grade squamous intraepithelial lesion (mild squamous dysplasia, ARLETH I)     B. Cervix biopsy, 12:00 o'clock:  - Low grade squamous intraepithelial lesion (mild squamous dysplasia, ARLETH I) in transformation zone mucosa. C. Endocervical curettings:  - Benign endocervical glandular epithelium without specific histopathologic abnormality. Additional Information       All reported additional testing was performed with appropriately reactive controls. These tests were developed and their performance characteristics determined by [facility] or appropriate performing facility, though some tests may be performed on tissues which have not been validated for performance characteristics (such as staining performed on alcohol exposed cell blocks and decalcified tissues). Results should be interpreted with caution and in the context of the patients’ clinical condition. These tests may not be cleared or approved by the U.S. Food and Drug Administration, though the FDA has determined that such clearance or approval is not necessary. These tests are used for clinical purposes and they should not be regarded as investigational or for research.  This laboratory has been approved by CLIA 88, designated as a high-complexity laboratory and is qualified to perform these tests. Richelle Elana Description          A. The specimen is received in formalin, labeled with the patient's name and hospital number, and is designated "cervix biopsy, 06:00 o'clock”. The specimen consists of a single tan-pink, rubbery and shaggy soft tissue fragment measuring 0.8 x 0.5 x 0.3 cm. The specimen is entirely submitted in a screened cassette. B. The specimen is received in formalin, labeled with the patient's name and hospital number, and is designated "cervix biopsy, 12”. The specimen consists of a single white, rubbery soft tissue fragment measuring 0.4 cm in greatest dimension. The specimen is entirely submitted in a screened cassette. C. The specimen is received in formalin, labeled with the patient's name and hospital number, and is designated "endocervical curettings”. The specimen consists of multiple colorless-red, mucinous and hemorrhagic tissue fragments measuring in aggregate of 0.3 x 0.2 x 0.1 cm. Note: due to the size and consistency of specimen, the tissue may not survive histologic processing. The specimen is drained into an embedding bag. Entirely submitted. One cassette. Note: The estimated total formalin fixation time based upon information provided by the submitting clinician and the standard processing schedule is under 72 hours. [facility]                FIRST TRIMESTER OBSTETRIC ULTRASOUND     Patient's last menstrual period was 2023 (exact date). INDICATION: Establish Gestational Age       FINDINGS:  See imaging report for details     Additional Findings: none     FHR: 161  IMPRESSION:    Single intrauterine pregnancy of 7 weeks 5days gestational age  Fetal cardiac activity detected. No adnexal masses seen.   EDC by LMP: 3/30/24  EDC by this Ultrasound: 3/30/24    Assigning a Final NHI  Please choose how you are assigning the NHI: The gestational age by LMP is </= 8w 6d and demonstrates 5 or fewer days difference from the gestational age by [facility], therefore the final NHI will be based on the LMP    Final NHI: 3/30/24 by LMP. Erica Dale, [address] [facility] [address]  [address] [facility]  [place] [address] [facility] [address]  Dept: [phone]  Dept Fax: [phone]  Loc: [phone]  Loc Fax: [phone]  Ultrasound Probe Disinfection    A transvaginal ultrasound was performed. Prior to use, disinfection was performed with High Level Disinfection Process (Lokalite). Probe serial number F: [id number] was used. Assessment/Plan:       She is a  with Patient's last menstrual period was 2023 (exact date). US today is showing a viable IUP  F/U for ob intake, followed by initial prenatal exam in  2 wks.          Problem List Items Addressed This Visit        Other    7 weeks gestation of pregnancy    Relevant Orders    AMB US OB < 14 weeks single or first gestation level 1    Ambulatory Referral to Maternal Fetal Medicine   Other Visit Diagnoses     Amenorrhea    -  Primary    Relevant Orders    AMB US OB < 14 weeks single or first gestation level 1    Ambulatory Referral to Maternal Fetal Medicine    Encounter to establish gestational age using ultrasound        Relevant Orders    AMB US OB < 14 weeks single or first gestation level 1    Ambulatory Referral to Maternal Fetal Medicine          Orders Placed This Encounter   Procedures   • Ambulatory Referral to Maternal Fetal Medicine   • AMB US OB < 14 weeks single or first gestation level 1

## 2023-08-17 NOTE — PATIENT INSTRUCTIONS
Again, congratulations on your pregnancy! NEXT STEPS  Get Prenatal bloodwork  Call MFM to schedule next ultrasound (done 12-13 wks)   Contact information for [facility] (AKA Maternal Fetal Medicine)- Main Number [phone]   Return to our office in 1-2 weeks for an OB intake and initial prenatal Exam(or sooner if any problems/concerns arise- see packet for things to report)  Check out [facility] website to read the "Pregnancy Essentials Guide" -this has lots of great early pregnancy information     It can be found by going to [place]-->select services-->select women's health-->select Obstetrics  [email or web address]     Below is a variety of information that is useful in early pregnancy   Genetic screening can be very confusing for most people   We do recommend genetic screening universally for all pregnant women. Our goal is to have the most healthy uncomplicated pregnancy possible and this is one way to identify possible complications early. Common disorders we can screen for include: Down Syndrome, Neural tube defects ( like spina bifida or gastroschisis) and trisomy 15, even possibly more  There are several options we will talk more about. Below is some information to get you started thinking about this. We will discuss this more at the next appt. GUIDE TO GENETIC TESTING     Aneuploidy Testing  Trisomy 21 (Down Syndrome), Trisomy 18, and Open Neural Tube Defects (Spina Bifida). You may choose one of the following options: See below for CPT/Diagnostic codes  NIPT (Non-Invasive Prenatal Testing or Cell Free- Fetal DNA): This simple and accurate non-invasive prenatal screening blood test offers results for early risk assessment of Down syndrome (Trisomy 21), or Trisomy 25, trisomy 15 and other aneuploidy conditions. The test also offers an optional analysis for fetal sex.   The test analyzes the relative amount of 21, 18, 13; X and Y chromosome material in circulating cell-free DNA from a maternal blood sample. This test can be performed at any time after 10 weeks gestation. If you elect this test, you will also have an AFP (alpha-fetoprotein) blood test to test for open neural tube defects. Recommended follow up to a positive result is genetic counseling and prenatal diagnosis. Sequential Screening with Nuchal Translucency: This is a two-step test to detect whether a fetus is at increased risk for trisomy 24, trisomy 25, trisomy 15 and open neural tube defects. The test has a narrow window for testing (the first step must be performed between 10 and 13 weeks gestation). It includes two blood draws and an ultrasound. The ultrasound measures the amount of fluid behind the baby’s neck called the nuchal translucency (NT). The blood tests measures three different maternal hormone levels, -- pregnancy associated plasma protein (KEENAN-A), human chorionic gonadotrophin (hCG), and dimeric inhibin A (LAURA). These measurements in combination with some maternal information such as height and weight are used to calculate whether the baby is at increased risk for Down Syndrome or Trisomy 18. An alpha-fetoprotein test (AFP) is also included to test for open neural tube defects. Recommended follow up to a positive result is additional testing that is more definitive, and referral for genetic counseling and prenatal diagnosis. Quad Screen: This test is also known as the quadruple marker test.  It is a test that measures levels of four substances in a pregnant woman’s blood--Alpha-fetoprotein (AFP), a protein made by the developing baby; Human chorionic gonadotropin (hCG), a hormone made by the placenta; Estriol, a hormone made by the placenta and the baby’s liver; and Inhibin A, another hormone made by the placenta.   Typically, the quad screen is done between weeks 15 and 20 of pregnancy--the second trimester and the results indicate whether the baby is at higher risk for Down Syndrome (Trisomy 21), Trisomy 18, and open neural tube defects. This is a screening test.  Recommended follow up to a positive result is additional testing that is more definitive, as well as referral for genetic counseling and prenatal diagnosis. Trisomy 21 Trisomy 18 Trisomy 13   NIPT  (FPR 0.1%) <99% <98% 99%   Sequential Screening (FPR 3.5%) 92% 90% N/A   Quad Screen   (FPR 5%) 83% 80% N/A   (FPR is False Positive Rate)        Additional Screening Tests Offered  Cystic Fibrosis: Cystic Fibrosis is the most common inherited disease of children and young adults. The carrier frequency is 1 in 24, to 1 in 97. Both parents need to be carriers for a child to be affected (25% chance). One in 200, children born are affected. Cystic fibrosis is a disorder of mucus production and produces abnormally thick mucus leading to life threatening lung infections, digestion problems, poor growth, infertility, and more. Symptoms range from mild to severe, but individuals with severe disease may die in childhood. With treatments today, people with Cystic Fibrosis can live into their 30’s. CF does not affect intelligence. Recommended follow up to a positive result is testing of the baby’s father. Spinal Muscular Atrophy (SMA): SMA is the most common inherited cause of early childhood death. The carrier frequency is 1 in 52 to 1 in 67 in the [place] and both parents need to be carriers for a child to be affected (25% chance). 1 in 11,000 children are affected. SMA is a progressive degeneration of lower motor neurons. Muscle weakness is the most common type with respiratory failure by the age of 3years old. Muscles responsible for crawling, walking, swallowing, and head and neck control are most severely affected. Recommended follow up to a positive result is testing of the baby’s father.       Fragile X Syndrome (the most common inherited cause of developmental delays): Fragile X syndrome is an “X-linked” genetic disease, which means it is only carried by the mom. Unfortunately, 1 in 250 females are carriers and a child has a 50% chance of being affected if this is the case. 1 in 4000 boys is affected with Fragile X and 1 in 8000 girls is affected. Approximately 1/3 of all children born with Fragile X also have autism and hyperactivity. Recommended follow up to a positive result is genetic counseling and prenatal diagnosis. Guide To Insurance Coverage For Genetic Screening  Due to the complexity of coverage guidelines, we are unable to quote benefits or guarantee insurance coverage for any of these tests. Insurance benefits are plan-specific and offer vastly different coverage based on your policy. The handout attached explains the benefits to each test, and also provides the billing (CPT) codes for each test.  Even if the testing is covered, it could be applied to any unmet deductibles, and copays may apply, resulting in a bill. You are encouraged to contact your insurance company to obtain your benefits based on your age and risk factors, so that there are no surprises. Test Insurance Procedure (CPT) code   NIPT-cell free fetal DNA Most accurate non-invasive test- not always covered w/o risk factors 23249   Sequential Screen w/ NT US Current standard test-should be covered Part 1: (if lab is [facility]) [address]   (If lab is [facility]) [address]  Part 2: (if lab is [id number])11608,33316,29969, [address]  (If lab is [facility]) 49268   Quad screen Old standard-use if past 1st trimester 91696, [address] [place] [address], [address], [address]   CF- Cystic Fibrosis   64054   SMA- Spinal Musc.  Atrophy   05693   Fragile X   [id number]         Diagnosis code used Varies based on history- But will be one of these:  Encounter for  screening for other genetic defect Z36.8  Advanced Maternal Age (over 28) O12.46  Family History of Genetic Disease Carrier Z84.81     Please contact your insurance company with the appropriate CPT code from the attached list, and diagnosis code applicable to your situation. We ask that you review this information and decide what testing you would like to have performed. Please note that the Sequential Screening with Nuchal Translucency has a smaller window of time to be performed during pregnancy (Prior to 14 wks). Warning Signs During Pregnancy  The list below includes warning signs your providers would like you to be aware of. If you experience any of these at any time during your pregnancy, please call us as soon as possible. Vaginal bleeding   Sharp abdominal pain that does not go away   Fever (more than 100. 4? F and is not relieved with Tylenol)   Persistent vomiting lasting greater than 24 hours   Chest pain   Pain or burning when you urinate   Severe headache that doesn’t resolve with Tylenol   Blurred vision or seeing spots in your vision   Sudden swelling of your face or hands   Redness, swelling or pain in a leg   A sudden weight gain in just a few days   Decrease in your baby’s movements (after 28 weeks or the 6th month of pregnancy)   A loss of watery fluid from your vagina - can be a gush, a trickle or  continuous wetness   After 20 weeks of pregnancy, rhythmic cramping (greater than 4 per hour)  or menstrual like low/pelvic pain     Call the OFFICE [phone] for any questions/emergencies. At night or on the weekend, please indicate it is an emergency and the DOCTOR on call will be paged.      Discomforts of Early Pregnancy     Tips for coping with nausea and vomiting during pregnancy   Eat meals and snacks slowly   Eat every 1-2 hours to avoid a full stomach   Don’t skip meals, avoid empty stomach   Eat a snack prior to getting out of bed   Avoid food and beverages with a strong aroma   Avoid dehydration - drink enough fluid to keep the urine pale yellow   Drink fluids before a meal to minimize the effect of a full stomach   Limit the amount of coffee and beverages that contain caffeine   Eliminate spicy, odorous, high fat (fried foods), acidic (tomato products) and sweet foods   Fluids that contain lemon (lemonade), mint (tea) or orange can usually be well tolerated   Snacks and meals that contain low-fat protein (lean meats, fish, poultry and eggs) along with eating easily digestible carbs (fruit, rice, toast, crackers and dry cereal) may be tolerated better   Foods with ginger may be well tolerated. May use ginger root powder, capsules or extract (up to 1000 mg per day)   Drink liquids in small amounts     If symptoms persist, please contact your provider. Tips for coping with constipation during pregnancy   Increase fiber and fluids.  - Drink 8-10 cups of liquid, like water or low-sugar juice daily  - Keep urine pale with fluids (water, milk), fruit and vegetables   Eat a well-balanced diet that contains high fiber food (fruits, vegetables, whole grain breads and cereals, bran and dried beans)   Take a 30-minute walk daily   You may take a mild stool softener such as Colace®     If symptoms persist, please contact your provider. For any emergencies, PLEASE CALL THE OFFICE at [phone]. If the office is closed, the doctor on call will be paged by the answering service. Medications and Pregnancy- see Pregnancy Essentials guide online- page 9     Expected Weight Gain During Pregnancy  If you have a healthy BMI (18-25) prior to pregnancy:  The recommended weight gain is between 25-35 pounds. Approximate weight gain  in the first trimester is 1-4.5 pounds. An expected weight gain during the second and  third trimester is approximately one pound per week. If you have a BMI of less than 18 prior to pregnancy,  you are considered underweight:  The recommended weight gain is between 28-40 pounds. Approximate weight gain  in the first trimester is 1-4.5 pounds. An expected weight gain during the second and  third trimester is just over one pound per week.   If your BMI is 25 to 29.9: you are considered overweight:  You should gain 1/2 to 2/3 pound during the second and third trimester, for a total  weight gain of 15 to 25 pounds. If you have a BMI of greater than 30 prior to pregnancy,  you are considered overweight:  The recommended weight gain is between 15-25 pounds. Approximate weight gain  in the first trimester is 1-4.5 pounds. An expected weight gain during the second  and third trimester is approximately 0.5 pound per week. Foods to avoid during pregnancy:   Unpasteurized milk, juice and cheese  - Soft cheeses like feta or brie (if made with UNPASTEURIZED milk)   Unheated deli meats like lunchmeat and hotdogs   Undercooked poultry, beef, pork, seafood including raw sushi     What fish is safe to eat during pregnancy? Eat 8 to 12 ounces of fish a week. Pick from this group frequently, especially if you follow  the American Heart Association’s recommendation to eat fish at least 2 times a week. AVOID HIGH MERCURY FISH  A single meal of the following fish can put  you over the Environmental Protection  Agency’s safe limit for the month. High mercury fish:  Shark  Swordfish  [employer]  Tile Fish     Caffeine and Pregnancy     The  and [facility] of Obstetrics and Gynecologists (ACOG) urge pregnant  women to limit their caffeine consumption to no more than 200 milligrams (mg) per day. This is  comparable to having one 12-ounce cup of coffee a day. This level has been shown not to increase risk  of miscarriage, growth or  labor complications. Effects of higher levels are not known. Exercise During Pregnancy  A daily exercise program that consists of 30 minutes a day is recommended.    Low impact exercises like walking and swimming are great exercises throughout  all of pregnancy   If you’re an avid strength  avoid lifting very heavy weights - nothing more  than 30 pounds     Drink plenty of fluids while exercising to stay hydrated. Be careful to avoid overheating. ACTIVITIES TO AVOID   Exercises that can make you lose your balance. Activities that can put your baby at risk i.e. horseback riding, scuba diving, skiing  or snowboarding. Any other sport that puts you at risk for getting hit in the  abdominal area. Do not use saunas, steam rooms or hot tubs (that have a higher temperature  than 100F)   After the first trimester, avoid exercises that require you to lay flat on your back. Avoid exceeding a heart rate greater than 140 beats per minute.  As long as you are  able to hold a conversation while exercising your heart rate is likely acceptable

## 2023-08-18 ENCOUNTER — TELEPHONE (OUTPATIENT)
Facility: HOSPITAL | Age: 33
End: 2023-08-18

## 2023-08-18 NOTE — TELEPHONE ENCOUNTER
Called patient to schedule MFM appointment, based on referral issued to Maternal Fetal Medicine by [facility] office. Left voicemail requesting patient to call back and schedule appointment, with office number for return call [phone].

## 2023-08-24 ENCOUNTER — TELEPHONE (OUTPATIENT)
Dept: PERINATAL CARE | Facility: OTHER | Age: 33
End: 2023-08-24

## 2023-08-24 NOTE — TELEPHONE ENCOUNTER
Called patient to schedule MFM appointment, based on referral issued to Maternal Fetal Medicine by [place] office. Left voicemail requesting patient to call back and schedule appointment, with office number for return call [phone].

## 2023-08-31 ENCOUNTER — INITIAL PRENATAL (OUTPATIENT)
Dept: OBGYN CLINIC | Facility: CLINIC | Age: 33
End: 2023-08-31

## 2023-08-31 DIAGNOSIS — Z31.430 ENCOUNTER OF FEMALE FOR TESTING FOR GENETIC DISEASE CARRIER STATUS FOR PROCREATIVE MANAGEMENT: ICD-10-CM

## 2023-08-31 DIAGNOSIS — Z34.81 PRENATAL CARE, SUBSEQUENT PREGNANCY, FIRST TRIMESTER: Primary | ICD-10-CM

## 2023-08-31 DIAGNOSIS — Z36.9 UNSPECIFIED ANTENATAL SCREENING: ICD-10-CM

## 2023-08-31 NOTE — PROGRESS NOTES
OB INTAKE INTERVIEW  Patient is 33 y.o.y.o. who presents for OB intake at 9-5 wks  She is accompanied by phone during this encounter  The father of her baby Selina Minaya is involved in the pregnancy and they are .   Last Menstrual Period: 23  Ultrasound: Measured 7 weeks 5 days on 23  Estimated Date of Delivery: 3/30/24 via LMP     Signs/Symptoms of Pregnancy  Current pregnancy symptoms: nausea, fatigue  Constipation no  Headaches no  Cramping/spotting no  PICA cravings no    Diabetes-    If patient has 1 or more, please order early 1 hour GTT  History of GDM no  BMI >35 YES  History of PCOS or current metformin use no  History of LGA/macrosomic infant (4000g/9lbs) no    If patient has 2 or more, please order early 1 hour GTT  BMI>30 YES  AMA no  First degree relative with type 2 diabetes no  History of chronic HTN, hyperlipidemia, elevated A1C no  High risk race (, , ,  or ) no    Hypertension- if you answer yes, please order preeclampsia labs (cbc, comprehensive metabolic panel, urine protein creatinine ratio, uric acid)  History of of chronic HTN no  History of gestational HTN no  History of preeclampsia, eclampsia, or HELLP syndrome no  History of diabetes no  History of lupus, autoimmune disease, kidney disease no    Thyroid- if yes order TSH with reflex T4  History of thyroid disease no    Bleeding Disorder or Hx of DVT-patient or first degree relative with history of. Order the following if not done previously.    (Factor V, antithrombin III, prothrombin gene mutation, protein C and S Ag, lupus anticoagulant, anticardiolipin, beta-2 glycoprotein)   no    OB/GYN-  History of abnormal pap smear YES       Date of last pap smear 11/3/2021 abn  History of HPV YES  History of Herpes/HSV no  History of other STI (gonorrhea, chlamydia, trich) no  History of prior  no  History of prior  no  History of  delivery prior to 36 weeks 6 days no  History of blood transfusion no  Ok for blood transfusion yes    Substance screening- if yes outside of tobacco for her or anyone in her home-order urine drug screen  History of tobacco use YES  Currently using tobacco no  Currently using alcohol no  Presently using drugs no  Past drug use  no  IV drug use- no  Partner drug use no  Parent/Family drug use no    MRSA Screening-   Does the pt have a hx of MRSA? no  If yes- please follow MRSA protocol and obtain a nasal swab for MRSA culture    Immunizations:  Influenza vaccine given this season no  Discussed Tdap vaccine yes  Discussed COVID Vaccine yes    Genetic/MFM-  Do you or your partner have a history of any of the following in yourselves or first degree relatives? Cystic fibrosis no  Spinal muscular atrophy no  Hemoglobinopathy/Sickle Cell/Thalassemia no  Fragile X Intellectual Disability no    If yes, discuss carrier screening and recommend consultation with [facility]/genetic counseling. If no, discuss option for carrier screening and/or genetic testing with Nuchal Ultrasound.  Patient interested yes  Appointment at [facility] made yes    Interview education  [facility] Pregnancy Essentials Book reviewed, discussed and attached to their AVS yes    Nurse/Family Partnership- patient may qualify no; referral placed no    Prenatal lab work scripts yes  Extra labs ordered:  One hour glucola, SMA, CF    Aspirin/Preeclampsia Screen    Risk Level Risk Factor Recommendation   LOW Prior Uncomplicated full-term delivery no No Aspirin recommendation        MODERATE Nulliparity YES Recommend low-dose aspirin if     BMI>30 YES 2 or more moderate risk factors    Family History Preeclampsia (mother/sister) no     35yr old or greater no      or Low Socioeconomic no     IVF Pregnancy  no     Personal History Risks (low birth weight, prior adverse preg outcome, >10yr preg interval) no         HIGH History of Preeclampsia no Recommend low-dose aspirin if Multifetal gestation no 1 or more high risk factors    Chronic HTN no     Type 1 or 2 Diabetes no     Renal Disease no     Autoimmune Disease  no      Contraindications to ASA therapy:  NSAID/ ASA allergy: no  Nasal polyps: no  Asthma with history of ASA induced bronchospasm: no  Relative contraindications:  History of GI bleed: no  Active peptic ulcer disease: no  Severe hepatic dysfunction: no    Patient should be recommended to take ASA 162mg during this pregnancy from 12-36wks to lower her risk of preeclampsia: yes      The patient has a history now or in prior pregnancy notable for:   Hx of prior ectopic 2021,  Hx of anxiety/depression- no meds currently,  BMI 39.5,  A +,           Details that I feel the provider should be aware of: pt requested CF & SMA testing. PN1 visit scheduled. The patient was oriented to our practice, reviewed delivering physicians and [facility] for Delivery. All questions were answered. PN phone interview completed. Pt & , Pari Hollingsworth, happy with pregnancy. PN bldwk including a one hour glucose, SMA & CF ordered in epic. Pt to await authorization phone call prior to having bldwk drawn. Has appts scheduled for PN1 & PNC. Advised pt to call with any further questions/concerns.          Interviewed by: Jeanie Suarez RN, [address] [place] [address]

## 2023-09-14 ENCOUNTER — APPOINTMENT (OUTPATIENT)
Dept: LAB | Facility: HOSPITAL | Age: 33
End: 2023-09-14
Payer: COMMERCIAL

## 2023-09-14 DIAGNOSIS — Z36.9 UNSPECIFIED ANTENATAL SCREENING: ICD-10-CM

## 2023-09-14 DIAGNOSIS — Z31.430 ENCOUNTER OF FEMALE FOR TESTING FOR GENETIC DISEASE CARRIER STATUS FOR PROCREATIVE MANAGEMENT: ICD-10-CM

## 2023-09-14 DIAGNOSIS — Z34.81 PRENATAL CARE, SUBSEQUENT PREGNANCY, FIRST TRIMESTER: ICD-10-CM

## 2023-09-14 DIAGNOSIS — Z34.81 PRENATAL CARE, SUBSEQUENT PREGNANCY, FIRST TRIMESTER: Primary | ICD-10-CM

## 2023-09-14 LAB
ABO GROUP BLD: NORMAL
BACTERIA UR QL AUTO: NORMAL /HPF
BASOPHILS # BLD AUTO: 0.05 THOUSANDS/ÂΜL (ref 0–0.1)
BASOPHILS NFR BLD AUTO: 1 % (ref 0–1)
BILIRUB UR QL STRIP: NEGATIVE
BLD GP AB SCN SERPL QL: NEGATIVE
CLARITY UR: CLEAR
COLOR UR: COLORLESS
EOSINOPHIL # BLD AUTO: 0.14 THOUSAND/ÂΜL (ref 0–0.61)
EOSINOPHIL NFR BLD AUTO: 1 % (ref 0–6)
ERYTHROCYTE [DISTWIDTH] IN BLOOD BY AUTOMATED COUNT: 11.9 % (ref 11.6–15.1)
GLUCOSE UR STRIP-MCNC: NEGATIVE MG/DL
HBV SURFACE AG SER QL: NORMAL
HCT VFR BLD AUTO: 38.4 % (ref 34.8–46.1)
HCV AB SER QL: NORMAL
HGB BLD-MCNC: 12.9 G/DL (ref 11.5–15.4)
HGB UR QL STRIP.AUTO: NEGATIVE
HIV 1+2 AB+HIV1 P24 AG SERPL QL IA: NORMAL
HIV 2 AB SERPL QL IA: NORMAL
HIV1 AB SERPL QL IA: NORMAL
HIV1 P24 AG SERPL QL IA: NORMAL
IMM GRANULOCYTES # BLD AUTO: 0.04 THOUSAND/UL (ref 0–0.2)
IMM GRANULOCYTES NFR BLD AUTO: 0 % (ref 0–2)
KETONES UR STRIP-MCNC: NEGATIVE MG/DL
LEUKOCYTE ESTERASE UR QL STRIP: NEGATIVE
LYMPHOCYTES # BLD AUTO: 2.55 THOUSANDS/ÂΜL (ref 0.6–4.47)
LYMPHOCYTES NFR BLD AUTO: 23 % (ref 14–44)
MCH RBC QN AUTO: 30.6 PG (ref 26.8–34.3)
MCHC RBC AUTO-ENTMCNC: 33.6 G/DL (ref 31.4–37.4)
MCV RBC AUTO: 91 FL (ref 82–98)
MONOCYTES # BLD AUTO: 0.58 THOUSAND/ÂΜL (ref 0.17–1.22)
MONOCYTES NFR BLD AUTO: 5 % (ref 4–12)
NEUTROPHILS # BLD AUTO: 7.63 THOUSANDS/ÂΜL (ref 1.85–7.62)
NEUTS SEG NFR BLD AUTO: 70 % (ref 43–75)
NITRITE UR QL STRIP: NEGATIVE
NON-SQ EPI CELLS URNS QL MICRO: NORMAL /HPF
NRBC BLD AUTO-RTO: 0 /100 WBCS
PH UR STRIP.AUTO: 6 [PH]
PLATELET # BLD AUTO: 436 THOUSANDS/UL (ref 149–390)
PMV BLD AUTO: 8.9 FL (ref 8.9–12.7)
PROT UR STRIP-MCNC: NEGATIVE MG/DL
RBC # BLD AUTO: 4.22 MILLION/UL (ref 3.81–5.12)
RBC #/AREA URNS AUTO: NORMAL /HPF
RH BLD: POSITIVE
RUBV IGG SERPL IA-ACNC: 148 IU/ML
SP GR UR STRIP.AUTO: 1.01 (ref 1–1.03)
SPECIMEN EXPIRATION DATE: NORMAL
TREPONEMA PALLIDUM IGG+IGM AB [PRESENCE] IN SERUM OR PLASMA BY IMMUNOASSAY: NORMAL
UROBILINOGEN UR STRIP-ACNC: <2 MG/DL
WBC # BLD AUTO: 10.99 THOUSAND/UL (ref 4.31–10.16)
WBC #/AREA URNS AUTO: NORMAL /HPF

## 2023-09-14 PROCEDURE — 87389 HIV-1 AG W/HIV-1&-2 AB AG IA: CPT

## 2023-09-14 PROCEDURE — 86803 HEPATITIS C AB TEST: CPT

## 2023-09-14 PROCEDURE — 86900 BLOOD TYPING SEROLOGIC ABO: CPT

## 2023-09-14 PROCEDURE — 86780 TREPONEMA PALLIDUM: CPT

## 2023-09-14 PROCEDURE — 81001 URINALYSIS AUTO W/SCOPE: CPT

## 2023-09-14 PROCEDURE — 81329 SMN1 GENE DOS/DELETION ALYS: CPT

## 2023-09-14 PROCEDURE — 87340 HEPATITIS B SURFACE AG IA: CPT

## 2023-09-14 PROCEDURE — 86901 BLOOD TYPING SEROLOGIC RH(D): CPT

## 2023-09-14 PROCEDURE — 36415 COLL VENOUS BLD VENIPUNCTURE: CPT

## 2023-09-14 PROCEDURE — 85025 COMPLETE CBC W/AUTO DIFF WBC: CPT

## 2023-09-14 PROCEDURE — 86850 RBC ANTIBODY SCREEN: CPT

## 2023-09-14 PROCEDURE — 86762 RUBELLA ANTIBODY: CPT

## 2023-09-14 PROCEDURE — 87086 URINE CULTURE/COLONY COUNT: CPT

## 2023-09-14 PROCEDURE — 81220 CFTR GENE COM VARIANTS: CPT

## 2023-09-15 LAB — BACTERIA UR CULT: NORMAL

## 2023-09-19 ENCOUNTER — INITIAL PRENATAL (OUTPATIENT)
Dept: OBGYN CLINIC | Facility: MEDICAL CENTER | Age: 33
End: 2023-09-19

## 2023-09-19 VITALS
DIASTOLIC BLOOD PRESSURE: 88 MMHG | BODY MASS INDEX: 39.58 KG/M2 | SYSTOLIC BLOOD PRESSURE: 126 MMHG | HEART RATE: 96 BPM | WEIGHT: 216.4 LBS

## 2023-09-19 DIAGNOSIS — Z11.51 SCREENING FOR HUMAN PAPILLOMAVIRUS (HPV): ICD-10-CM

## 2023-09-19 DIAGNOSIS — N87.0 CIN I (CERVICAL INTRAEPITHELIAL NEOPLASIA I): ICD-10-CM

## 2023-09-19 DIAGNOSIS — Z34.81 ENCOUNTER FOR SUPERVISION OF OTHER NORMAL PREGNANCY IN FIRST TRIMESTER: Primary | ICD-10-CM

## 2023-09-19 DIAGNOSIS — F32.2 CURRENT SEVERE EPISODE OF MAJOR DEPRESSIVE DISORDER WITHOUT PSYCHOTIC FEATURES WITHOUT PRIOR EPISODE (HCC): ICD-10-CM

## 2023-09-19 DIAGNOSIS — Z3A.12 12 WEEKS GESTATION OF PREGNANCY: ICD-10-CM

## 2023-09-19 LAB
CITATION REF LAB TEST: NORMAL
CLINICAL INFO: NORMAL
ETHNIC BACKGROUND STATED: NORMAL
GENE DIS ANL CARRIER INTERP-IMP: NORMAL
GENE MUT TESTED BLD/T: NORMAL
LAB DIRECTOR NAME PROVIDER: NORMAL
REASON FOR REFERRAL (NARRATIVE): NORMAL
RECOMMENDATION PATIENT DOC-IMP: NORMAL
REF LAB TEST METHOD: NORMAL
SERVICE CMNT-IMP: NORMAL
SL AMB  POCT GLUCOSE, UA: NORMAL
SL AMB POCT URINE PROTEIN: NORMAL
SMN1 GENE MUT ANL BLD/T: NORMAL
SPECIMEN SOURCE: NORMAL

## 2023-09-19 PROCEDURE — 87491 CHLMYD TRACH DNA AMP PROBE: CPT | Performed by: NURSE PRACTITIONER

## 2023-09-19 PROCEDURE — 87591 N.GONORRHOEAE DNA AMP PROB: CPT | Performed by: NURSE PRACTITIONER

## 2023-09-19 PROCEDURE — 87624 HPV HI-RISK TYP POOLED RSLT: CPT | Performed by: NURSE PRACTITIONER

## 2023-09-19 PROCEDURE — G0145 SCR C/V CYTO,THINLAYER,RESCR: HCPCS | Performed by: NURSE PRACTITIONER

## 2023-09-19 NOTE — PROGRESS NOTES
Omaira Blackburn is here for her first prenatal visit  Age: 28 y.o. LMP: Patient's last menstrual period was 2023 (exact date). Gestational age 12.3 week based on LMP consistent with early US    3  Para 0  (1 AB, 1 ectopic)        Previous C Section: No    She denies nausea and vomiting. Nausea recently resolved. She has had no vaginal bleeding  Patients complains of intemrittent headaches. Increased po water intake, tylenol with small amount of ingested caffeine. Call office if accompanied by visual changes and epigastric pain. She  is planning consultation with maternal fetal medicine for a sequential screen and genetic screening. This is scheduled for 23    Allergies: Patient has no known allergies. Medication use :   Current Outpatient Medications   Medication Sig Dispense Refill   • choline fenofibrate (TRILIPIX) 45 MG capsule Take 45 mg by mouth daily     • Prenatal Vit-Fe Fumarate-FA (PRENATAL PO) Take by mouth       No current facility-administered medications for this visit. She is a former smoker, quit 2 years ago. Last cigarette was just before pregnancy while drinking. In this pregnancy her  medical history is significant for migraines, prior ectopic pregnancy, CIN1, anxiety and depression. Prenatal Labs    A  positive  Antibody negative  CBC 12.9/38.2,   Hep B and Hep C nonreactive  HIV nonreactive  Syphilis NR   Rubella immune  UA UC WNL    GC/CT collected today  11/3/21 ASCUS Pap with + other HR HPV ; 21 colp CIN1; collected today        Her obstetrical, medical, surgical and family history was reviewed  Her physical exam was within normal limits  FHT's: 152    Discussed as well during this visit was diet, prenatal vitamins, prenatal visits, lab testing, breast feeding, vaccinations, maternal fetal medicine consultations, prevention of exposure to infectious diseases and toxic chemicals, lifestyle.     Influenza vaccine: recommended  Covid vaccine: J& J one dose  Risk factors for this pregnancy include: CIN1, migraines, depression and anxiety    Problem   12 Weeks Gestation of Pregnancy   Current Severe Episode of Major Depressive Disorder Without Psychotic Features Without Prior Episode ([facility])     Current severe episode of major depressive disorder without psychotic features without prior episode ([address])  Negative 2 question depression screen. Aware to call office with any worsening of symptoms.

## 2023-09-20 ENCOUNTER — ROUTINE PRENATAL (OUTPATIENT)
Facility: HOSPITAL | Age: 33
End: 2023-09-20
Payer: COMMERCIAL

## 2023-09-20 VITALS
HEART RATE: 100 BPM | SYSTOLIC BLOOD PRESSURE: 128 MMHG | DIASTOLIC BLOOD PRESSURE: 78 MMHG | HEIGHT: 62 IN | BODY MASS INDEX: 39.86 KG/M2 | WEIGHT: 216.6 LBS

## 2023-09-20 DIAGNOSIS — Z36.89 ENCOUNTER TO ESTABLISH GESTATIONAL AGE USING ULTRASOUND: ICD-10-CM

## 2023-09-20 DIAGNOSIS — Z36.82 NUCHAL TRANSLUCENCY OF FETUS ON PRENATAL ULTRASOUND: ICD-10-CM

## 2023-09-20 DIAGNOSIS — Z3A.12 12 WEEKS GESTATION OF PREGNANCY: ICD-10-CM

## 2023-09-20 DIAGNOSIS — Z13.79 GENETIC SCREENING: ICD-10-CM

## 2023-09-20 DIAGNOSIS — O99.211 OBESITY AFFECTING PREGNANCY IN FIRST TRIMESTER: Primary | ICD-10-CM

## 2023-09-20 PROCEDURE — 99243 OFF/OP CNSLTJ NEW/EST LOW 30: CPT | Performed by: OBSTETRICS & GYNECOLOGY

## 2023-09-20 PROCEDURE — 76813 OB US NUCHAL MEAS 1 GEST: CPT | Performed by: OBSTETRICS & GYNECOLOGY

## 2023-09-20 NOTE — LETTER
September 30, 2023     Edith Dukes, [address] [place] [address]    Patient: Eliud Noriega   YOB: 1990   Date of Visit: 9/20/2023       Dear Ms Saint Heller: Thank you for referring Eliud Noriega to me for evaluation. Below are my notes for this consultation. If you have questions, please do not hesitate to call me. I look forward to following your patient along with you. Sincerely,        Kit Lucero MD        CC: No Recipients    Kit Lucero MD  9/30/2023  9:28 AM  Sign when Signing Visit  A fetal ultrasound was completed. See Ob procedures in Epic for an interpretation and recommendations. Do not hesitate to contact us in [facility] if you have questions. Mercedes Ontiveros MD, [facility]  Maternal Fetal Medicine

## 2023-09-21 LAB
C TRACH DNA SPEC QL NAA+PROBE: NEGATIVE
CF COMMENT: ABNORMAL
CFTR MUT ANL BLD/T: ABNORMAL
N GONORRHOEA DNA SPEC QL NAA+PROBE: NEGATIVE

## 2023-09-22 ENCOUNTER — TELEPHONE (OUTPATIENT)
Dept: OBGYN CLINIC | Facility: MEDICAL CENTER | Age: 33
End: 2023-09-22

## 2023-09-22 NOTE — TELEPHONE ENCOUNTER
Discussed results of positive CF carrier screen.  will get tested through PCP and will forward results to our office.

## 2023-09-25 ENCOUNTER — PATIENT MESSAGE (OUTPATIENT)
Dept: OBGYN CLINIC | Facility: MEDICAL CENTER | Age: 33
End: 2023-09-25

## 2023-09-25 ENCOUNTER — CLINICAL SUPPORT (OUTPATIENT)
Dept: PERINATAL CARE | Facility: OTHER | Age: 33
End: 2023-09-25
Payer: COMMERCIAL

## 2023-09-25 DIAGNOSIS — Z14.1 CYSTIC FIBROSIS CARRIER: Primary | ICD-10-CM

## 2023-09-25 DIAGNOSIS — Z3A.13 13 WEEKS GESTATION OF PREGNANCY: ICD-10-CM

## 2023-09-25 DIAGNOSIS — Z3A.12 12 WEEKS GESTATION OF PREGNANCY: ICD-10-CM

## 2023-09-25 LAB
LAB AP GYN PRIMARY INTERPRETATION: NORMAL
Lab: NORMAL

## 2023-09-25 PROCEDURE — 36415 COLL VENOUS BLD VENIPUNCTURE: CPT | Performed by: OBSTETRICS & GYNECOLOGY

## 2023-09-25 NOTE — PROGRESS NOTES
Patient chose to have [facility] Non-invasive Prenatal Screen with fetal sex. Patient given brochure and is aware [facility] will contact their insurance and coordinate coverage. Patient made aware she will need to respond to text message or e-mail from [address] within 2 business days or testing will be run through insurance. Patient informed text message will come from area code  "415". Provided [employer] # [phone] and web site : [email or web address].   "[place] your test online" card with barcode and test tube ID provided to patient. Reviewed [employer]'s web site states 5-7 business days for results via their portal.   [facility] message will be sent to patient when [facility] receives results /provider reviews. 1 vials of blood drawn from  left  arm by Hellen Henry RN. Patient tolerated blood draw without difficulty. Specimens labeled with patient identifiers (name, date of birth, specimen collection date), order and specimen were verified with patient, packed and sent via [address]. FED EX  tracking #  [id number]  Copy of lab order scanned to Epic media. Maternal Fetal Medicine will have results in approximately 7-10 business days and will call patient or notify via [address] [place] [address]. Patient aware viewing lab result online will reveal fetal sex if ordered. Patient verbalized understanding of all instructions and no questions at this time.

## 2023-09-26 ENCOUNTER — PATIENT MESSAGE (OUTPATIENT)
Dept: OBGYN CLINIC | Facility: MEDICAL CENTER | Age: 33
End: 2023-09-26

## 2023-09-27 ENCOUNTER — TELEMEDICINE (OUTPATIENT)
Dept: PERINATAL CARE | Facility: CLINIC | Age: 33
End: 2023-09-27

## 2023-09-27 ENCOUNTER — TELEPHONE (OUTPATIENT)
Dept: PERINATAL CARE | Facility: CLINIC | Age: 33
End: 2023-09-27

## 2023-09-27 DIAGNOSIS — O35.2XX0 HEREDITARY DISEASE IN FAMILY POSSIBLY AFFECTING FETUS, AFFECTING MANAGEMENT OF MOTHER IN PREGNANCY, SINGLE OR UNSPECIFIED FETUS: ICD-10-CM

## 2023-09-27 DIAGNOSIS — Z14.1 CYSTIC FIBROSIS CARRIER: Primary | ICD-10-CM

## 2023-09-27 DIAGNOSIS — Z31.5 ENCOUNTER FOR PROCREATIVE GENETIC COUNSELING: ICD-10-CM

## 2023-09-27 PROCEDURE — NC001 PR NO CHARGE: Performed by: GENETIC COUNSELOR, MS

## 2023-09-27 NOTE — PROGRESS NOTES
Genetic Counseling   High-Risk Gestation Note    Appointment Date:  2023  Referred By: Liza Valdivia, [address] [facility]  YOB: 1990  Partner:  Calderon Aviles  Indication for Visit:  personal and/or family history of genetic disorder:  patient is carrier of cystic fibrosis  Pregnancy History:   Estimated Date of Delivery: 3/30/24  Estimated Gestational Age: 13w4d      Virtual Regular Visit    Verification of patient location:    Patient is located at Home in the following state in which I hold an active license [place]      Assessment/Plan:    Problem List Items Addressed This Visit    None  Visit Diagnoses     Cystic fibrosis carrier    -  Primary    Hereditary disease in family possibly affecting fetus, affecting management of mother in pregnancy, single or unspecified fetus        Encounter for procreative genetic counseling                   Reason for visit is   Chief Complaint   Patient presents with   • Virtual Regular Visit        Encounter provider Nora Dawkins    Provider located at [address] [address] [phone]      Recent Visits  No visits were found meeting these conditions. Showing recent visits within past 7 days and meeting all other requirements  Future Appointments  No visits were found meeting these conditions. Showing future appointments within next 150 days and meeting all other requirements       The patient was identified by name and date of birth. Reid Oseguera was informed that this is a telemedicine visit and that the visit is being conducted through the [facility]. She agrees to proceed. .  My office door was closed. No one else was in the room. She acknowledged consent and understanding of privacy and security of the video platform. The patient has agreed to participate and understands they can discontinue the visit at any time.        Benjamin Wray is a 28 y.o. female who presented with her partner for genetic counseling to discuss the abnormal cystic fibrosis carrier screening results. We reviewed the diagnosis and prognosis as well as the associated morbidity and mortality of cystic fibrosis. We also reviewed the autosomal recessive inheritance pattern. Should the father of the baby be a carrier, the pregnancy is at 25% risk to be affected and prenatal diagnosis, such as amniocentesis is available. We also reviewed  screening in the state of [place] for cystic fibrosis. After reviewing the benefits and limitations of cystic fibrosis carrier screening Leslee's partner elected to pursue testing. We reviewed the option of testing through [facility] (standard common mutation panel) or through [facility]. If they opt to go through [facility] an order will be placed electronically and the prior authorization team will contact Sarah Jose to schedule the blood draw once the approval is obtained. If they opt to go through [facility], full CFTR gene sequencing can be performed, a saliva kit can be sent directly to him, and out of pocket cost would be $250 if not covered by insurance. The couple will discuss the two options and contact me with their decision. Results take approximately 1-2 weeks and they will be contacted when they are available. Histories for the patient and her partner's family were taken during our session. Nimesh Martinez reports a male cousin (from a maternal uncle) who had a congenital heart defect requiring multiple surgeries. She also stated his "organs were not in the right place" thus he may also have heterotaxy. We reviewed that based on the distance of the affected relative the risk to the current pregnancy for the same or similar condition would not be significantly elevated over the general population. We reviewed the benefits and limitations of ultrasound in detecting cardiac defects and other organ abnormalities.   Further review of family history for the patient and her partner was noncontributory. The family history was not significant for other genetic diseases or disorders, intellectual disability, birth defects, fetal loss, or consanguinity. Patient reports being of [place] descent and that her  is of [place]/Upper sorbian descent. She denies either of them having known Ashkenazi [place] ancestry. We reviewed the option of expanded carrier screening. We discussed that the panels can test for carrier status for over 500 autosomal recessive and X-linked diseases. All of the diseases included on the panel are life threatening, life limiting, or have treatments available. After reviewing the benefits and limitations of expanded carrier screening Tracey Najjar declined the screening stating that would create more anxiety. She was informed that it is available at any time should she change her mind. Tracey Najjar had NIPS bloodwork drawn on 9/25/23 and results were pending at the time of our counseling session. We reviewed that MSAFP screening is recommended at 16-18 weeks gestation, which she opted to pursue. We reviewed that level II anatomy ultrasound is typically performed at approximately 20 weeks gestation. Level II ultrasound evaluation is between 60-80% accurate in detecting major physical birth defects and variations in fetal development that may be associated with chromosome/genetic abnormalities. Level II ultrasound evaluation is not able to detect all birth defects or health problems. Tracey Najjar is planning on attending her scheduled Level II anatomy ultrasound. Lastly, we discussed the fact that everyone in the general population regardless of age, family history, or medical background has approximately a 3-5% risk of having a child with some type of congenital anomaly, genetic disease or intellectual disability. Currently there are no tests available to rule out all birth defects or health problems.     Tracey Najjar was provided with our contact information. I encouraged her to call with any questions or concerns. Plan/Tests Ordered:  1) Patient and FOB elected CF carrier screening for Natasha Putnam - will contact me for [facility] or [facility] order. 2) Patient declined expanded carrier screening. 3) MSAFP screening at 15-20 weeks gestation - to be ordered by patient OB office. 4) Level II anatomy ultrasound scheduled for 11/13/23. HPI     Past Medical History:   Diagnosis Date   • Abnormal Pap smear of cervix     abn & HPV   • Ectopic pregnancy     2/2021   • Migraine     occas   • Varicella     childhood chickenpox       Past Surgical History:   Procedure Laterality Date   • MT LAPAROSCOPY W/RMVL ADNEXAL STRUCTURES N/A 2/14/2021    Procedure: diagnostic LAPAROSCOPIC evacuation of hemoperitoneam,;  Surgeon: Jj Dong MD;  Location: [facility];  Service: Gynecology       Current Outpatient Medications   Medication Sig Dispense Refill   • choline fenofibrate (TRILIPIX) 45 MG capsule Take 45 mg by mouth daily     • Prenatal Vit-Fe Fumarate-FA (PRENATAL PO) Take by mouth       No current facility-administered medications for this visit. No Known Allergies    Review of Systems    Video Exam    There were no vitals filed for this visit.     Physical Exam     Visit Time  Total Visit Duration: 45 minutes

## 2023-09-27 NOTE — TELEPHONE ENCOUNTER
Patient left message on my voicemail stating that Latasha Plata elected to pursue the CF Carrier screening through [facility]. An order was placed electronically for him. After approval is obtained from prior auth team he will be contacted by the genetic scheduling for his blood draw.

## 2023-09-27 NOTE — RESULT ENCOUNTER NOTE
Pap smear is normal.  However, the high risk HPV remains positive. This should be repeated in 1 year.

## 2023-09-28 ENCOUNTER — TELEPHONE (OUTPATIENT)
Dept: OBGYN CLINIC | Facility: CLINIC | Age: 33
End: 2023-09-28

## 2023-09-28 NOTE — TELEPHONE ENCOUNTER
----- Message from Rogers Avendano MD sent at 9/27/2023  5:17 PM EDT -----  Pap smear is normal.  However, the high risk HPV remains positive. This should be repeated in 1 year.

## 2023-09-30 PROBLEM — O99.211 OBESITY AFFECTING PREGNANCY IN FIRST TRIMESTER: Status: ACTIVE | Noted: 2023-09-30

## 2023-09-30 NOTE — PROGRESS NOTES
A fetal ultrasound was completed. See Ob procedures in Epic for an interpretation and recommendations. Do not hesitate to contact us in [facility] if you have questions. Liz Flores MD, [address] [facility]  Maternal Fetal Medicine

## 2023-10-02 DIAGNOSIS — O99.212 OBESITY AFFECTING PREGNANCY IN SECOND TRIMESTER, UNSPECIFIED OBESITY TYPE: ICD-10-CM

## 2023-10-02 DIAGNOSIS — Z3A.14 14 WEEKS GESTATION OF PREGNANCY: Primary | ICD-10-CM

## 2023-10-17 ENCOUNTER — ROUTINE PRENATAL (OUTPATIENT)
Dept: OBGYN CLINIC | Facility: MEDICAL CENTER | Age: 33
End: 2023-10-17

## 2023-10-17 ENCOUNTER — APPOINTMENT (OUTPATIENT)
Dept: LAB | Facility: MEDICAL CENTER | Age: 33
End: 2023-10-17
Payer: COMMERCIAL

## 2023-10-17 VITALS — SYSTOLIC BLOOD PRESSURE: 120 MMHG | BODY MASS INDEX: 39.51 KG/M2 | DIASTOLIC BLOOD PRESSURE: 90 MMHG | WEIGHT: 216 LBS

## 2023-10-17 DIAGNOSIS — Z33.1 INCIDENTAL PREGNANCY: ICD-10-CM

## 2023-10-17 DIAGNOSIS — F32.A ANXIETY AND DEPRESSION: ICD-10-CM

## 2023-10-17 DIAGNOSIS — O99.212 OBESITY AFFECTING PREGNANCY IN SECOND TRIMESTER, UNSPECIFIED OBESITY TYPE: ICD-10-CM

## 2023-10-17 DIAGNOSIS — Z3A.14 14 WEEKS GESTATION OF PREGNANCY: ICD-10-CM

## 2023-10-17 DIAGNOSIS — Z3A.16 16 WEEKS GESTATION OF PREGNANCY: Primary | ICD-10-CM

## 2023-10-17 DIAGNOSIS — Z34.82 PRENATAL CARE, SUBSEQUENT PREGNANCY, SECOND TRIMESTER: ICD-10-CM

## 2023-10-17 DIAGNOSIS — Z36.9 ENCOUNTER FOR ANTENATAL SCREENING: ICD-10-CM

## 2023-10-17 DIAGNOSIS — F41.9 ANXIETY AND DEPRESSION: ICD-10-CM

## 2023-10-17 LAB
GLUCOSE 1H P 50 G GLC PO SERPL-MCNC: 156 MG/DL (ref 40–134)
SL AMB  POCT GLUCOSE, UA: NORMAL
SL AMB POCT URINE PROTEIN: NORMAL

## 2023-10-17 PROCEDURE — 82950 GLUCOSE TEST: CPT

## 2023-10-17 PROCEDURE — 36415 COLL VENOUS BLD VENIPUNCTURE: CPT

## 2023-10-17 PROCEDURE — 82105 ALPHA-FETOPROTEIN SERUM: CPT

## 2023-10-17 NOTE — PROGRESS NOTES
Patient here for prenatal visit. She states she is feeling very stressed at this time; denies cramping or spotting. She does not feel movement yet. Order for AFP screen given today. Flu vaccine offered; she declines. Level II US scheduled 11/13/23. Urine neg for protein and glucose.

## 2023-10-17 NOTE — PROGRESS NOTES
Problem List Items Addressed This Visit       16 weeks gestation of pregnancy     Luz Hensley  is a 28 y.o.  @16w3d who presents for routine prenatal visit. Denies OB complaints. NIPT - low risk   AFP - order provided   NT scan completed 23  Level II - scheduled 23    Has not yet appreciated fetal movement. Denies contractions, cramping, leakage of fluid or vaginal bleeding. Reviewed PTL precautions and reasons to call. Obesity affecting pregnancy in second trimester     Pregravid BMI 39.5. No WG thus far in pregnancy. Recommended initiation of LDASA for preE prevention given nulliparity and elevated BMI. She will plan to start this today. Planning to go for early 1 hr GT after today's apts. Has nutrition consult scheduled 10/27/23   Level II US scheduled. Anxiety and depression     C/o heightened anxiety and depression in past few weeks. Feels interaction with family is a trigger. Requesting referral to counseling. Was on lexapro prior to pregnancy but self d/c with pregnancy. Reviewed safety of medications in pregnancy and recommendations pertaining to use. Declines to restart medication at this time but is aware to call with worsening/changing symptoms. No SI/HI reported. Referral to [facility] placed and contact information given.          Relevant Orders    Ambulatory referral to Psych Services     Other Visit Diagnoses       Encounter for  screening    -  Primary    Relevant Orders    Alpha fetoprotein, maternal    Prenatal care, subsequent pregnancy, second trimester        Relevant Orders    POCT urine dip (Completed)    Incidental pregnancy        Relevant Orders    Alpha fetoprotein, maternal

## 2023-10-18 DIAGNOSIS — O99.810 ABNORMAL GLUCOSE AFFECTING PREGNANCY: Primary | ICD-10-CM

## 2023-10-19 LAB
2ND TRIMESTER 4 SCREEN SERPL-IMP: NORMAL
AFP ADJ MOM SERPL: 1.09
AFP INTERP AMN-IMP: NORMAL
AFP INTERP SERPL-IMP: NORMAL
AFP INTERP SERPL-IMP: NORMAL
AFP SERPL-MCNC: 30.8 NG/ML
AGE AT DELIVERY: 34.5 YR
GA METHOD: NORMAL
GA: 16.4 WEEKS
IDDM PATIENT QL: NO
MULTIPLE PREGNANCY: NO
NEURAL TUBE DEFECT RISK FETUS: 9231 %

## 2023-10-27 ENCOUNTER — CLINICAL SUPPORT (OUTPATIENT)
Dept: NUTRITION | Facility: HOSPITAL | Age: 33
End: 2023-10-27
Payer: COMMERCIAL

## 2023-10-27 VITALS — BODY MASS INDEX: 40.13 KG/M2 | WEIGHT: 219.4 LBS

## 2023-10-27 DIAGNOSIS — Z3A.14 14 WEEKS GESTATION OF PREGNANCY: ICD-10-CM

## 2023-10-27 DIAGNOSIS — O99.212 OBESITY AFFECTING PREGNANCY IN SECOND TRIMESTER, UNSPECIFIED OBESITY TYPE: ICD-10-CM

## 2023-10-27 PROCEDURE — 97802 MEDICAL NUTRITION INDIV IN: CPT

## 2023-10-27 NOTE — PROGRESS NOTES
Nutrition Assessment Form    Patient Name: Ant Reno    YOB: 1990    Sex: Female     Assessment Date: 10/27/2023  Start Time: 8 am Stop Time:  9 am Total Minutes: 61     Data:  Present at session: self   Parent/Patient Concerns/reason for visit: "I'm pregnant and I want to gain healthy weight and be healthy after"   Medical Dx/Reason for Referral: Z3A.14 14 weeks gestation of pregnancy  O99.212 Obesity affecting pregnancy in second trimester, unspecified obesity type    Past Medical History:   Diagnosis Date    Abnormal Pap smear of cervix     abn & HPV    Ectopic pregnancy     2/2021    Migraine     occas    Miscarriage 2/2021    Ectopic Pregnancy    Varicella     childhood chickenpox       Current Outpatient Medications   Medication Sig Dispense Refill    choline fenofibrate (TRILIPIX) 45 MG capsule Take 45 mg by mouth daily      Prenatal Vit-Fe Fumarate-FA (PRENATAL PO) Take by mouth       No current facility-administered medications for this visit. Additional Meds/Supplements:    Special Learning Needs/barriers to learning/any new barriers    Height: HC Readings from Last 5 Encounters:   No data found for [facility].      Weight: Wt Readings from Last 10 Encounters:   10/17/23 98 kg (216 lb)   09/20/23 98.2 kg (216 lb 9.6 oz)   09/19/23 98.2 kg (216 lb 6.4 oz)   08/17/23 98 kg (216 lb)   05/26/23 95.3 kg (210 lb 3.2 oz)   06/23/22 96.2 kg (212 lb)   12/07/21 92 kg (202 lb 12.8 oz)   11/03/21 91.4 kg (201 lb 9.6 oz)   02/24/21 90.3 kg (199 lb)   02/14/21 86.2 kg (190 lb)     Estimated body mass index is 39.51 kg/m² as calculated from the following:    Height as of 9/20/23: 5' 2" (1.575 m). Weight as of 10/17/23: 98 kg (216 lb).    Recent Weight Change: []Yes     [x]No  Amount:       Energy Needs: No calculations performed for this visit   No Known Allergies or intolerances    Social History     Substance and Sexual Activity   Alcohol Use Not Currently    Comment: none with pregnancy    ___no ETOH   Social History     Tobacco Use   Smoking Status Former    Packs/day: 0.00    Years: 5.00    Total pack years: 0.00    Types: Cigarettes   Smokeless Tobacco Never       Who shops? patient   Who cooks/cooking methods/Eating out/take out habits   patient  Cooking methods: bake/sautee/air cleaning/grill/ slow cooker     Take out: __2-3_ x/wk   Dining out __2__ x/month   Exercise: Non structured  but cleared for activity from MD     Other: ie: Sleep habits/ stress level/ work habits household-lives with ?/ food security Wakes in the middle of the night and has issues falling asleep  Sleeps better while pregnant than before   Sleeps 6 hrs a night   for [employer], works from home   Prior Nutritional Counseling? []Yes     [x]No  When:      Why:         Diet Hx:  Breakfast: activia yogurt (1-2 depending on hunger level),   2 slices [place] toast on white bread  May have grapes Diet:   Lunch: may skip/ work through it. Does not skip on weekends   Leftovers  Mac and cheese  Chicken noodle soup  Watermelon, grapes, carrots, kira   8 oz regular coffee w/ creamer   Coke, diet coke, sprite mostly  ~64 oz water per day   Dinner: cooks marinated chicken, simon broil occasionally, may have pasta, broccoli, brussel sprouts, green beans,   [employer] chicken quesadilla ad cheesy gordita crunch  Gyros  [employer] cheeseburger and 4 chicken nuggets, small fries          Snacks: AM -   PM - grapes, goldfish occasionally, townhouse crackers  HS -    Other Notes/ Initial Assessment:  Pre-pregnancy weight 216 lb  18 weeks gestation  MD recommended she gain between 11-22 lb and walk 30 minutes 5 days a week   Patient stated her first glucose tolerance test showed elevated BG ~159. Has a 3 hour test scheduled. RD discussed importance of regular meals and balancing meals with CHO from fruit, starchy vegetables, lean proteins, unsaturated fats.  Provided examples of food sources of each  Discussed limiting sources of Mercury to no more than 6 oz per week  Encouraged increasing intake of calcium, provided examples of various food sources. Discussed role of protein in both BG and weight management. Encouraged patient to combine CHO with protein for BG control and it incrase satiety to assist with portion control  Discussed the plate method of portioning foods, including half a plate fruits and vegetables or a half plate all vegetables, 1/4 of the plate a lean protein source or meat, and a 1/4 of the plate being a whole grain carb- usually 1/2-1c. This should be followed for at least 2 meals of the day, but could also be followed for all 3. Provided Pregnancy Nutrition Therapy from Nutrition Care Manual     Updated assessment (Follow up note only):     Nutrition Diagnosis:   Food and nutrition related knowledge deficit  related to Lack of prior exposure to accurate nutrition related information as evidenced by No prior knowledge of need for food and nutrition related recommendations       Any change or new dx since previous visit:     Nutrition Diagnosis:   N/a      Medical Nutrition Therapy Intervention:  []Individualized Meal Plan []Understanding Lab Values   []Basic Pathophysiology of Disease []Food/Medication Interactions   []Food Diary [x]Exercise 150 mins of moderate activity weekly, discussed importance of variety of activity, including wt bearing activities 2-3x/wk x 10-15mins.  Discussed importance of activity throughout the lifecycle and its impact on overall health/stress management, etc.  ACTIVITY GOAL- Walk or use Peloton 4 days a week for at least 30 minutes   []Lifestyle/Behavior Modification Techniques []Medication, Mechanism of Action   []Label Reading: CHO/ Na/ Fat/ other_________ []Self Blood Glucose Monitoring   [x]Weight/BMI Goals: gain 11-22 during pregnancy []Other -           Comprehension: []Excellent  []Very Good  [x]Good  []Fair   []Poor    Receptivity: []Excellent  []Very Good  [x]Good  []Fair []Poor    Expected Compliance: []Excellent  []Very Good  [x]Good  []Fair   []Poor        Goals (initial)/ Progress made on previous goals/new goals:  Patient will consume 3 dairy equivalents daily by next follow up   2. Patient will have lunch balanced with CHO and protein  5 days a week by next follow up   3. No follow-ups on file.   Labs:  CMP  Lab Results   Component Value Date    K 3.9 02/14/2021     02/14/2021    CO2 23 02/14/2021    BUN 9 02/14/2021    CREATININE 0.66 02/14/2021    CALCIUM 9.3 02/14/2021    AST 18 02/14/2021    ALT 30 02/14/2021    ALKPHOS 55 02/14/2021    EGFR 118 02/14/2021       BMP  Lab Results   Component Value Date    CALCIUM 9.3 02/14/2021    K 3.9 02/14/2021    CO2 23 02/14/2021     02/14/2021    BUN 9 02/14/2021    CREATININE 0.66 02/14/2021       Lipids  No results found for: "CHOL"  No results found for: "HDL"  No results found for: "[facility]"  No results found for: "TRIG"  No results found for: "CHOLHDL"    Hemoglobin A1C  No results found for: "HGBA1C"    Fasting Glucose  No results found for: "GLUF"    Insulin     Thyroid  No results found for: "TSH", "T6GIEKL", "H1ZSVKI", "THYROIDAB"    Hepatic Function Panel  Lab Results   Component Value Date    ALT 30 02/14/2021    AST 18 02/14/2021    ALKPHOS 55 02/14/2021       Celiac Disease Antibody Panel  No results found for: "ENDOMYSIAL IGA", "GLIADIN IGA", "GLIADIN IGG", "IGA", "TISSUE TRANSGLUT AB", "TTG IGA"   Iron  No results found for: "IRON", "TIBC", "FERRITIN"         Olga Lima, [address] [facility] [address]

## 2023-10-30 ENCOUNTER — PATIENT MESSAGE (OUTPATIENT)
Dept: PSYCHIATRY | Facility: CLINIC | Age: 33
End: 2023-10-30

## 2023-11-13 ENCOUNTER — ROUTINE PRENATAL (OUTPATIENT)
Facility: HOSPITAL | Age: 33
End: 2023-11-13
Payer: COMMERCIAL

## 2023-11-13 VITALS
SYSTOLIC BLOOD PRESSURE: 136 MMHG | HEART RATE: 92 BPM | DIASTOLIC BLOOD PRESSURE: 96 MMHG | HEIGHT: 62 IN | WEIGHT: 218.4 LBS | BODY MASS INDEX: 40.19 KG/M2

## 2023-11-13 DIAGNOSIS — O99.212 OBESITY AFFECTING PREGNANCY IN SECOND TRIMESTER, UNSPECIFIED OBESITY TYPE: ICD-10-CM

## 2023-11-13 DIAGNOSIS — O99.810 ABNORMAL GLUCOSE AFFECTING PREGNANCY: Primary | ICD-10-CM

## 2023-11-13 DIAGNOSIS — Z3A.20 20 WEEKS GESTATION OF PREGNANCY: ICD-10-CM

## 2023-11-13 DIAGNOSIS — E66.1 CLASS 2 DRUG-INDUCED OBESITY WITHOUT SERIOUS COMORBIDITY WITH BODY MASS INDEX (BMI) OF 38.0 TO 38.9 IN ADULT: ICD-10-CM

## 2023-11-13 DIAGNOSIS — Z3A.20 20 WEEKS GESTATION OF PREGNANCY: Primary | ICD-10-CM

## 2023-11-13 DIAGNOSIS — O99.810 ABNORMAL GLUCOSE AFFECTING PREGNANCY: ICD-10-CM

## 2023-11-13 PROCEDURE — 76817 TRANSVAGINAL US OBSTETRIC: CPT | Performed by: OBSTETRICS & GYNECOLOGY

## 2023-11-13 PROCEDURE — 76811 OB US DETAILED SNGL FETUS: CPT | Performed by: OBSTETRICS & GYNECOLOGY

## 2023-11-13 PROCEDURE — 99214 OFFICE O/P EST MOD 30 MIN: CPT | Performed by: OBSTETRICS & GYNECOLOGY

## 2023-11-13 NOTE — PROGRESS NOTES
A fetal ultrasound was completed. See Ob procedures in Epic for an interpretation and recommendations. Do not hesitate to contact us in [facility] if you have questions. Kyle Infante MD, [address] [facility]  Maternal Fetal Medicine

## 2023-11-13 NOTE — LETTER
November 13, 2023     Lesley Caller, [address]  [address]    Patient: Monalisa Jean   YOB: 1990   Date of Visit: 11/13/2023       Dear Ms. Ashish Marmolejo: Thank you for referring Monalisa Jean to me for evaluation. Below are my notes for this consultation. If you have questions, please do not hesitate to call me. I look forward to following your patient along with you. Sincerely,        Maggie Bourgeois MD        CC: No Recipients    Maggie Bourgeois MD  11/13/2023  1:12 PM  Sign when Signing Visit  A fetal ultrasound was completed. See Ob procedures in Epic for an interpretation and recommendations. Do not hesitate to contact us in [facility] if you have questions. Yasemin Marcum MD, [address] [facility]  Maternal Fetal Medicine

## 2023-11-13 NOTE — PROGRESS NOTES
Ultrasound Probe Disinfection    A transvaginal ultrasound was performed. Prior to use, disinfection was performed with High Level Disinfection Process ([employer]). Probe serial number [id number]: [id number] was used.       Roland Guard  11/13/23  10:36 AM

## 2023-11-14 ENCOUNTER — LAB (OUTPATIENT)
Dept: LAB | Facility: MEDICAL CENTER | Age: 33
End: 2023-11-14
Payer: COMMERCIAL

## 2023-11-14 ENCOUNTER — APPOINTMENT (OUTPATIENT)
Dept: LAB | Facility: MEDICAL CENTER | Age: 33
End: 2023-11-14
Payer: COMMERCIAL

## 2023-11-14 ENCOUNTER — ROUTINE PRENATAL (OUTPATIENT)
Dept: OBGYN CLINIC | Facility: MEDICAL CENTER | Age: 33
End: 2023-11-14
Payer: COMMERCIAL

## 2023-11-14 VITALS — DIASTOLIC BLOOD PRESSURE: 84 MMHG | BODY MASS INDEX: 40.24 KG/M2 | WEIGHT: 220 LBS | SYSTOLIC BLOOD PRESSURE: 132 MMHG

## 2023-11-14 DIAGNOSIS — O99.212 OBESITY AFFECTING PREGNANCY IN SECOND TRIMESTER, UNSPECIFIED OBESITY TYPE: ICD-10-CM

## 2023-11-14 DIAGNOSIS — O99.810 ABNORMAL GLUCOSE AFFECTING PREGNANCY: ICD-10-CM

## 2023-11-14 DIAGNOSIS — I10 CHRONIC HYPERTENSION: ICD-10-CM

## 2023-11-14 DIAGNOSIS — Z3A.20 20 WEEKS GESTATION OF PREGNANCY: ICD-10-CM

## 2023-11-14 DIAGNOSIS — Z34.82 PRENATAL CARE, SUBSEQUENT PREGNANCY, SECOND TRIMESTER: Primary | ICD-10-CM

## 2023-11-14 DIAGNOSIS — O10.919 CHRONIC HYPERTENSION AFFECTING PREGNANCY: ICD-10-CM

## 2023-11-14 LAB
ALBUMIN SERPL BCP-MCNC: 3.7 G/DL (ref 3.5–5)
ALP SERPL-CCNC: 57 U/L (ref 34–104)
ALT SERPL W P-5'-P-CCNC: 12 U/L (ref 7–52)
ANION GAP SERPL CALCULATED.3IONS-SCNC: 10 MMOL/L
AST SERPL W P-5'-P-CCNC: 12 U/L (ref 13–39)
BASOPHILS # BLD AUTO: 0.04 THOUSANDS/ÂΜL (ref 0–0.1)
BASOPHILS NFR BLD AUTO: 0 % (ref 0–1)
BILIRUB SERPL-MCNC: 0.31 MG/DL (ref 0.2–1)
BUN SERPL-MCNC: 11 MG/DL (ref 5–25)
CALCIUM SERPL-MCNC: 9.5 MG/DL (ref 8.4–10.2)
CHLORIDE SERPL-SCNC: 100 MMOL/L (ref 96–108)
CO2 SERPL-SCNC: 26 MMOL/L (ref 21–32)
CREAT SERPL-MCNC: 0.48 MG/DL (ref 0.6–1.3)
CREAT UR-MCNC: 77.1 MG/DL
EOSINOPHIL # BLD AUTO: 0.13 THOUSAND/ÂΜL (ref 0–0.61)
EOSINOPHIL NFR BLD AUTO: 1 % (ref 0–6)
ERYTHROCYTE [DISTWIDTH] IN BLOOD BY AUTOMATED COUNT: 12.8 % (ref 11.6–15.1)
EST. AVERAGE GLUCOSE BLD GHB EST-MCNC: 103 MG/DL
GFR SERPL CREATININE-BSD FRML MDRD: 128 ML/MIN/1.73SQ M
GLUCOSE P FAST SERPL-MCNC: 90 MG/DL (ref 65–99)
HBA1C MFR BLD: 5.2 %
HCT VFR BLD AUTO: 36.6 % (ref 34.8–46.1)
HGB BLD-MCNC: 11.9 G/DL (ref 11.5–15.4)
IMM GRANULOCYTES # BLD AUTO: 0.07 THOUSAND/UL (ref 0–0.2)
IMM GRANULOCYTES NFR BLD AUTO: 1 % (ref 0–2)
LYMPHOCYTES # BLD AUTO: 2.1 THOUSANDS/ÂΜL (ref 0.6–4.47)
LYMPHOCYTES NFR BLD AUTO: 18 % (ref 14–44)
MCH RBC QN AUTO: 30.9 PG (ref 26.8–34.3)
MCHC RBC AUTO-ENTMCNC: 32.5 G/DL (ref 31.4–37.4)
MCV RBC AUTO: 95 FL (ref 82–98)
MONOCYTES # BLD AUTO: 0.69 THOUSAND/ÂΜL (ref 0.17–1.22)
MONOCYTES NFR BLD AUTO: 6 % (ref 4–12)
NEUTROPHILS # BLD AUTO: 8.51 THOUSANDS/ÂΜL (ref 1.85–7.62)
NEUTS SEG NFR BLD AUTO: 74 % (ref 43–75)
NRBC BLD AUTO-RTO: 0 /100 WBCS
PLATELET # BLD AUTO: 408 THOUSANDS/UL (ref 149–390)
PMV BLD AUTO: 9.3 FL (ref 8.9–12.7)
POTASSIUM SERPL-SCNC: 4.1 MMOL/L (ref 3.5–5.3)
PROT SERPL-MCNC: 7 G/DL (ref 6.4–8.4)
PROT UR-MCNC: 8 MG/DL
PROT/CREAT UR: 0.1 MG/G{CREAT} (ref 0–0.1)
RBC # BLD AUTO: 3.85 MILLION/UL (ref 3.81–5.12)
SL AMB  POCT GLUCOSE, UA: NORMAL
SL AMB POCT URINE PROTEIN: NORMAL
SODIUM SERPL-SCNC: 136 MMOL/L (ref 135–147)
WBC # BLD AUTO: 11.54 THOUSAND/UL (ref 4.31–10.16)

## 2023-11-14 PROCEDURE — 36415 COLL VENOUS BLD VENIPUNCTURE: CPT

## 2023-11-14 PROCEDURE — 80053 COMPREHEN METABOLIC PANEL: CPT

## 2023-11-14 PROCEDURE — 84156 ASSAY OF PROTEIN URINE: CPT

## 2023-11-14 PROCEDURE — 85025 COMPLETE CBC W/AUTO DIFF WBC: CPT

## 2023-11-14 PROCEDURE — 83036 HEMOGLOBIN GLYCOSYLATED A1C: CPT

## 2023-11-14 PROCEDURE — 82105 ALPHA-FETOPROTEIN SERUM: CPT

## 2023-11-14 PROCEDURE — 81002 URINALYSIS NONAUTO W/O SCOPE: CPT | Performed by: PHYSICIAN ASSISTANT

## 2023-11-14 PROCEDURE — PNV: Performed by: PHYSICIAN ASSISTANT

## 2023-11-14 PROCEDURE — 82570 ASSAY OF URINE CREATININE: CPT

## 2023-11-14 NOTE — ASSESSMENT & PLAN NOTE
BP elevated at 16 week visit and again today. Noted to be elevated at [address] yesterday 136/96 and decrease by time of discharge. 130/94 at arrival today and decreased to 132/84 by discharge. Discussed management as cHTN. Baseline labs ordered and encouraged completion. To continue LDASA. PreE sx and precautions reviewed. She is agreeable to obtain a home BP cuff. Advised BID checks and beginning a log. To RTO in 1 week for review and cuff calibration. Discussed goal BP <140/90. Reviewed use of medication if not well controlled with lifestyle changes. Diet and exercise recommendations reviewed. Verbalizes understanding and agreement.

## 2023-11-14 NOTE — ASSESSMENT & PLAN NOTE
Farhan Valle  is a 28 y.o. [id number] @20w3d who presents for routine prenatal visit. Denies OB complaints. NIPT - low risk   AFP - neg 10/17/23  Level II - completed yesterday - f/u growth and missed anatomy in 4 weeks     + fetal movement. Denies contractions, cramping, leakage of fluid or vaginal bleeding. Reviewed PTL precautions and reasons to call.

## 2023-11-14 NOTE — PROGRESS NOTES
Patient here for prenatal visit. She states she has heartburn; denies spotting or cramping. She is starting to feel movement. Level II US 11/13/23. AFP screen completed. Flu vaccine offered; she declines. Urine neg for protein and glucose.

## 2023-11-14 NOTE — PROGRESS NOTES
Problem List Items Addressed This Visit       20 weeks gestation of pregnancy     Jim Kiran  is a 28 y.o. [id number] @20w3d who presents for routine prenatal visit. Denies OB complaints. NIPT - low risk   AFP - neg 10/17/23  Level II - completed yesterday - f/u growth and missed anatomy in 4 weeks     + fetal movement. Denies contractions, cramping, leakage of fluid or vaginal bleeding. Reviewed PTL precautions and reasons to call. Obesity affecting pregnancy in second trimester     TWG 4 lbs. Goal 11-20. Pregestational BMI 40  Encouraged to obtain 3 hr GTT ASAP. Reviewed diet and exercise recommendations. Abnormal glucose affecting pregnancy     Has order for A1C from [facility]. Enocuraged to go for 3hr GTT ASAP. Chronic hypertension affecting pregnancy     BP elevated at 16 week visit and again today. Noted to be elevated at [address] yesterday 136/96 and decrease by time of discharge. 130/94 at arrival today and decreased to 132/84 by discharge. Discussed management as cHTN. Baseline labs ordered and encouraged completion. To continue LDASA. PreE sx and precautions reviewed. She is agreeable to obtain a home BP cuff. Advised BID checks and beginning a log. To RTO in 1 week for review and cuff calibration. Discussed goal BP <140/90. Reviewed use of medication if not well controlled with lifestyle changes. Diet and exercise recommendations reviewed. Verbalizes understanding and agreement.           Relevant Orders    CBC and differential    Comprehensive metabolic panel    Protein / creatinine ratio, urine    Uric acid     Other Visit Diagnoses       Prenatal care, subsequent pregnancy, second trimester    -  Primary    Relevant Orders    POCT urine dip (Completed)

## 2023-11-14 NOTE — ASSESSMENT & PLAN NOTE
TWG 4 lbs. Goal 11-20. Pregestational BMI 40  Encouraged to obtain 3 hr GTT ASAP. Reviewed diet and exercise recommendations.

## 2023-11-15 ENCOUNTER — TELEPHONE (OUTPATIENT)
Dept: OBGYN CLINIC | Facility: CLINIC | Age: 33
End: 2023-11-15

## 2023-11-15 NOTE — TELEPHONE ENCOUNTER
----- Message from Enrike House MD sent at 11/15/2023 12:11 PM EST -----  Results are normal  Please notify patient

## 2023-11-16 LAB
2ND TRIMESTER 4 SCREEN SERPL-IMP: NORMAL
AFP ADJ MOM SERPL: 1.41
AFP INTERP AMN-IMP: NORMAL
AFP INTERP SERPL-IMP: NORMAL
AFP INTERP SERPL-IMP: NORMAL
AFP SERPL-MCNC: 67.6 NG/ML
AGE AT DELIVERY: 34.5 YR
GA METHOD: NORMAL
GA: 20.4 WEEKS
IDDM PATIENT QL: NO
MULTIPLE PREGNANCY: NO
NEURAL TUBE DEFECT RISK FETUS: 3501 %

## 2023-11-18 ENCOUNTER — LAB (OUTPATIENT)
Dept: LAB | Facility: HOSPITAL | Age: 33
End: 2023-11-18
Payer: COMMERCIAL

## 2023-11-18 DIAGNOSIS — O99.810 ABNORMAL GLUCOSE AFFECTING PREGNANCY: ICD-10-CM

## 2023-11-18 LAB
GLUCOSE 1H P 100 G GLC PO SERPL-MCNC: 214 MG/DL (ref 65–179)
GLUCOSE 2H P 100 G GLC PO SERPL-MCNC: 207 MG/DL (ref 65–154)
GLUCOSE 3H P 100 G GLC PO SERPL-MCNC: 110 MG/DL (ref 65–139)
GLUCOSE P FAST SERPL-MCNC: 104 MG/DL (ref 65–94)

## 2023-11-18 PROCEDURE — 82952 GTT-ADDED SAMPLES: CPT

## 2023-11-18 PROCEDURE — 82951 GLUCOSE TOLERANCE TEST (GTT): CPT

## 2023-11-18 PROCEDURE — 36415 COLL VENOUS BLD VENIPUNCTURE: CPT

## 2023-11-20 ENCOUNTER — TELEPHONE (OUTPATIENT)
Dept: OBGYN CLINIC | Facility: CLINIC | Age: 33
End: 2023-11-20

## 2023-11-20 DIAGNOSIS — O99.810 ABNORMAL GLUCOSE AFFECTING PREGNANCY: Primary | ICD-10-CM

## 2023-11-20 PROBLEM — O24.410 DIET CONTROLLED GESTATIONAL DIABETES MELLITUS (GDM) IN SECOND TRIMESTER: Status: ACTIVE | Noted: 2023-10-18

## 2023-11-20 NOTE — TELEPHONE ENCOUNTER
----- Message from Clover Marin PA-C sent at 11/20/2023  3:40 PM EST -----  Please call Felicitas Hackett to notify 3 hr GTT returned abnormal, indicating gestational diabetes. Would recommend f/u with diabetic pathways through MFM to discuss dietary control.    Thanks,  Herber Victoria

## 2023-11-20 NOTE — RESULT ENCOUNTER NOTE
Please call Damari Camacho to notify 3 hr GTT returned abnormal, indicating gestational diabetes. Would recommend f/u with diabetic pathways through MFM to discuss dietary control.    Thanks,  Amilcar Akhtar

## 2023-11-21 ENCOUNTER — ROUTINE PRENATAL (OUTPATIENT)
Dept: OBGYN CLINIC | Facility: MEDICAL CENTER | Age: 33
End: 2023-11-21

## 2023-11-21 VITALS
DIASTOLIC BLOOD PRESSURE: 94 MMHG | SYSTOLIC BLOOD PRESSURE: 136 MMHG | BODY MASS INDEX: 40.42 KG/M2 | HEART RATE: 82 BPM | WEIGHT: 221 LBS

## 2023-11-21 DIAGNOSIS — Z34.82 PRENATAL CARE, SUBSEQUENT PREGNANCY, SECOND TRIMESTER: ICD-10-CM

## 2023-11-21 DIAGNOSIS — O10.919 CHRONIC HYPERTENSION AFFECTING PREGNANCY: Primary | ICD-10-CM

## 2023-11-21 LAB
SL AMB  POCT GLUCOSE, UA: NORMAL
SL AMB POCT URINE PROTEIN: NORMAL

## 2023-11-21 NOTE — PROGRESS NOTES
Patient here for blood pressure check today. 21w3d  She denies any complaints. She is feeling movement. Urine neg for protein and glucose.

## 2023-11-21 NOTE — ASSESSMENT & PLAN NOTE
Andrew Nguyễn  is a 28 y.o. [id number] @21w3d here for BP recheck. Has been checking BP at home for the past week. Normotensive readings when checking at rest. Has a hx of anxiety and depression and has had some heightening of anxiety sx. Only elevations (130s-140s/90s) have occurred when she is feeling anxious and she does not recheck when sx pass. Anxiety is mostly situational relating. Has reached out to baby and me and is on wait list for appt. Otherwise BP ranging 120-130s/70s. She did not bring her cuff for calibration today. She has started walking daily and is making dietary changes. Discussed goal BP of <140/90. Recommended she begin BID BP checks rather than QD checks. Can do morning and night unless symptomatic. If feels anxious at the time of a check - is to annotate this and recheck when sx pass. Discussed initiation of medication if not at goal. Agreeable. She will RTO in 1 week for cuff calibration and log review. Endorses good fetal movement. Denies contractions, cramping, leakage of fluid or vaginal bleeding. No OB complaints  Reviewed PTL precautions and reasons to call.

## 2023-11-21 NOTE — PROGRESS NOTES
Problem List Items Addressed This Visit       Chronic hypertension affecting pregnancy - Primary     Karly La  is a 28 y.o. [id number] @21w3d here for BP recheck. Has been checking BP at home for the past week. Normotensive readings when checking at rest. Has a hx of anxiety and depression and has had some heightening of anxiety sx. Only elevations (130s-140s/90s) have occurred when she is feeling anxious and she does not recheck when sx pass. Anxiety is mostly situational relating. Has reached out to baby and me and is on wait list for appt. Otherwise BP ranging 120-130s/70s. She did not bring her cuff for calibration today. She has started walking daily and is making dietary changes. Discussed goal BP of <140/90. Recommended she begin BID BP checks rather than QD checks. Can do morning and night unless symptomatic. If feels anxious at the time of a check - is to annotate this and recheck when sx pass. Discussed initiation of medication if not at goal. Agreeable. She will RTO in 1 week for cuff calibration and log review. Endorses good fetal movement. Denies contractions, cramping, leakage of fluid or vaginal bleeding. No OB complaints  Reviewed PTL precautions and reasons to call.             Other Visit Diagnoses       Prenatal care, subsequent pregnancy, second trimester        Relevant Orders    POCT urine dip (Completed)

## 2023-11-27 ENCOUNTER — TELEPHONE (OUTPATIENT)
Dept: PERINATAL CARE | Facility: OTHER | Age: 33
End: 2023-11-27

## 2023-11-27 NOTE — TELEPHONE ENCOUNTER
Called patient to schedule MFM appointment, based on referral issued to Maternal Fetal Medicine by [facility] office. Left voicemail requesting patient to call back and schedule appointment, with office number for return call [phone].

## 2023-12-05 ENCOUNTER — CLINICAL SUPPORT (OUTPATIENT)
Dept: POSTPARTUM | Facility: CLINIC | Age: 33
End: 2023-12-05

## 2023-12-05 DIAGNOSIS — Z32.2 ENCOUNTER FOR CHILDBIRTH INSTRUCTION: Primary | ICD-10-CM

## 2023-12-06 ENCOUNTER — CLINICAL SUPPORT (OUTPATIENT)
Dept: POSTPARTUM | Facility: CLINIC | Age: 33
End: 2023-12-06

## 2023-12-06 DIAGNOSIS — Z32.2 ENCOUNTER FOR CHILDBIRTH INSTRUCTION: Primary | ICD-10-CM

## 2023-12-07 ENCOUNTER — TELEPHONE (OUTPATIENT)
Facility: HOSPITAL | Age: 33
End: 2023-12-07

## 2023-12-07 NOTE — TELEPHONE ENCOUNTER
Called patient to schedule MFM appointment, based on referral issued to Maternal Fetal Medicine by [facility] office. Left voicemail requesting patient to call back and schedule appointment, with office number for return call [phone].

## 2023-12-11 ENCOUNTER — TELEPHONE (OUTPATIENT)
Dept: PERINATAL CARE | Facility: CLINIC | Age: 33
End: 2023-12-11

## 2023-12-11 ENCOUNTER — ROUTINE PRENATAL (OUTPATIENT)
Dept: OBGYN CLINIC | Facility: MEDICAL CENTER | Age: 33
End: 2023-12-11

## 2023-12-11 VITALS
DIASTOLIC BLOOD PRESSURE: 82 MMHG | BODY MASS INDEX: 41.04 KG/M2 | SYSTOLIC BLOOD PRESSURE: 124 MMHG | WEIGHT: 223 LBS | HEIGHT: 62 IN | HEART RATE: 97 BPM

## 2023-12-11 DIAGNOSIS — O99.212 OBESITY AFFECTING PREGNANCY IN SECOND TRIMESTER, UNSPECIFIED OBESITY TYPE: ICD-10-CM

## 2023-12-11 DIAGNOSIS — O24.410 DIET CONTROLLED GESTATIONAL DIABETES MELLITUS (GDM) IN SECOND TRIMESTER: Primary | ICD-10-CM

## 2023-12-11 DIAGNOSIS — Z3A.24 24 WEEKS GESTATION OF PREGNANCY: ICD-10-CM

## 2023-12-11 DIAGNOSIS — O10.919 CHRONIC HYPERTENSION AFFECTING PREGNANCY: ICD-10-CM

## 2023-12-11 DIAGNOSIS — Z34.82 PRENATAL CARE, SUBSEQUENT PREGNANCY, SECOND TRIMESTER: ICD-10-CM

## 2023-12-11 PROCEDURE — PNV: Performed by: CLINICAL NURSE SPECIALIST

## 2023-12-11 NOTE — PROGRESS NOTES
Prenatal Visit  Subjective:   Diana Loo is a 28 y.o. [id number] 24w2d here for Routine Prenatal Visit (Junior 3/30/A+/Labs - 28 week labs ordered /Flu declined )      Pt without complaints today. Denies unusual vaginal discharge, LOF, VB, or ctx. Reports Fetus is active. Objective:  Vitals:    12/11/23 0728   BP: 124/82   Pulse: 97     Pregravid Weight/BMI: 98 kg (216 lb) (BMI 39.50)  Current Weight: 101 kg (223 lb)   Total Weight Gain: 3.175 kg (7 lb)     Fetal Heart Rate: 145  Fundal Height (cm): 26 cm    OBGyn Exam  Physical Exam  Fetal Heart Rate: 145 , Fundal Height (cm): 26 cm  General: Not in acute distress and appearing well nourished and well groomed  Genitourinary:          Pelvic exam exam deferred   Cardiovascular:   Rate and Rhythm: Normal rate. Pulmonary:    Effort: normal, not labored  Abdominal:  Abdomen is soft and gravid    Musculoskeletal: Active movement of all extremities, no gross limitations of ROM     Edema: None  Neurological:   Mental Status: She is alert and oriented to person, place, and time. Skin: General: Skin is warm and dry. Psychiatric:    Mood and Affect: Mood normal.      Behavior: Behavior normal      Assessment & Plan:        1. Diet controlled gestational diabetes mellitus (GDM) in second trimester  Assessment & Plan:  Dx with GDM. Has not had classes yet-thus not checking BG yet.  scheduled for this week. Has growth US today as well. 2. Chronic hypertension affecting pregnancy  Assessment & Plan:  BP is normotensive today. 124/82- running around the same at home  Denies HA, vision changes or RUQ pain  Has US with MFM today. Continue Low Dose Aspirin and checking BP at home BID. Call if > 140/90      3. Prenatal care, subsequent pregnancy, second trimester  -     Anemia Panel w/Reflex, OB; Future  -     CBC and differential; Future  -     RPR-Syphilis Screening (Total Syphilis IGG/IGM);  Future  -     POCT urine dip    4. 24 weeks gestation of pregnancy  Assessment & Plan:  24w2d  Doing well, reports good FM  Reviewed the following today:  S/S PTL including to call if having >  4-6 ctx in an hour. S/S HTN disorders in pregnancy such as pre-eclampsia  Orders for routine 3rd trimester bloodwork given today -to be done around 26-28 wks- see ordered  Tdap vaccine- to be given to all pregnant women in the 3rd trimester (27-36 weeks) of each pregnancy in order to protect against pertussis. It is also recommended that anyone who is going to have close contact with the baby also get the vaccine at their health care provider. 5. Obesity affecting pregnancy in second trimester, unspecified obesity type  Assessment & Plan:  Taking Low Dose Aspirin for pre-e ppx.   TWG 3.175 kg (7 lb)   Now GDM- see that A&P          GLENNA Dubose  12/11/2023

## 2023-12-11 NOTE — ASSESSMENT & PLAN NOTE
Dx with GDM. Has not had classes yet-thus not checking BG yet.  scheduled for this week. Has growth US today as well.

## 2023-12-11 NOTE — ASSESSMENT & PLAN NOTE
BP is normotensive today. 124/82- running around the same at home  Denies HA, vision changes or RUQ pain  Has US with MFM today. Continue Low Dose Aspirin and checking BP at home BID.  Call if > 140/90

## 2023-12-11 NOTE — TELEPHONE ENCOUNTER
Left VMM to Gary Franco, explained change in [facility] availability for today ( sono sick) her appointment at [facility] was rescheduled. I have rescheduled her to [facility] Soto Tuesday 12/12 @ 2pm for ultrasound to ensure she had appointment this week, provided my direct TEAMS # for patient to call if this new day/time does not work or for any questions.

## 2023-12-11 NOTE — ASSESSMENT & PLAN NOTE
24w2d  Doing well, reports good FM  Reviewed the following today:  S/S PTL including to call if having >  4-6 ctx in an hour. S/S HTN disorders in pregnancy such as pre-eclampsia  Orders for routine 3rd trimester bloodwork given today -to be done around 26-28 wks- see ordered  Tdap vaccine- to be given to all pregnant women in the 3rd trimester (27-36 weeks) of each pregnancy in order to protect against pertussis. It is also recommended that anyone who is going to have close contact with the baby also get the vaccine at their health care provider.

## 2023-12-12 ENCOUNTER — CLINICAL SUPPORT (OUTPATIENT)
Dept: POSTPARTUM | Facility: CLINIC | Age: 33
End: 2023-12-12

## 2023-12-12 ENCOUNTER — ULTRASOUND (OUTPATIENT)
Dept: PERINATAL CARE | Facility: OTHER | Age: 33
End: 2023-12-12
Payer: COMMERCIAL

## 2023-12-12 VITALS
HEART RATE: 95 BPM | SYSTOLIC BLOOD PRESSURE: 132 MMHG | WEIGHT: 221 LBS | HEIGHT: 62 IN | DIASTOLIC BLOOD PRESSURE: 84 MMHG | BODY MASS INDEX: 40.67 KG/M2

## 2023-12-12 DIAGNOSIS — O24.410 DIET CONTROLLED GESTATIONAL DIABETES MELLITUS (GDM) IN SECOND TRIMESTER: ICD-10-CM

## 2023-12-12 DIAGNOSIS — Z3A.24 24 WEEKS GESTATION OF PREGNANCY: ICD-10-CM

## 2023-12-12 DIAGNOSIS — Z36.89 ENCOUNTER FOR ULTRASOUND TO CHECK FETAL GROWTH: ICD-10-CM

## 2023-12-12 DIAGNOSIS — Z32.2 ENCOUNTER FOR CHILDBIRTH INSTRUCTION: Primary | ICD-10-CM

## 2023-12-12 DIAGNOSIS — O99.212 OBESITY AFFECTING PREGNANCY IN SECOND TRIMESTER, UNSPECIFIED OBESITY TYPE: ICD-10-CM

## 2023-12-12 DIAGNOSIS — Z36.3 ENCOUNTER FOR ANTENATAL SCREENING FOR MALFORMATION: ICD-10-CM

## 2023-12-12 DIAGNOSIS — O10.919 CHRONIC HYPERTENSION AFFECTING PREGNANCY: ICD-10-CM

## 2023-12-12 PROCEDURE — 99214 OFFICE O/P EST MOD 30 MIN: CPT | Performed by: OBSTETRICS & GYNECOLOGY

## 2023-12-12 PROCEDURE — 76816 OB US FOLLOW-UP PER FETUS: CPT | Performed by: OBSTETRICS & GYNECOLOGY

## 2023-12-12 NOTE — PROGRESS NOTES
[address]: Ramses Gooden was seen today for fetal growth assessment ultrasound. See ultrasound report under "OB Procedures" tab. The time spent on this established patient on the encounter date included 5 minutes previsit service time reviewing records and precharting, 10 minutes face-to-face service time counseling regarding results and coordinating care, and 10 minutes charting, totalling 25 minutes.   Please don't hesitate to contact our office with any concerns or questions.  -Micky Mcintyre MD

## 2023-12-12 NOTE — PATIENT INSTRUCTIONS
Thank you for choosing us for your  care today. If you have any questions about your ultrasound or care, please do not hesitate to contact us or your primary obstetrician. Some general instructions for your pregnancy are:    Protect against coronavirus: get vaccinated - pregnant women are increased risk of severe COVID. Notify your primary care doctor if you have any symptoms. Exercise: Aim for 22 minutes per day (150 minutes per week) of regular exercise. Walking is great! Nutrition: aim for calcium-rich and iron-rich foods as well as healthy sources of protein. Learn about Preeclampsia: preeclampsia is a common, serious high blood pressure complication in pregnancy. A blood pressure of 143LDLZ (systolic or top number) or 59OORN (diastolic or bottom number) is not normal and needs evaluation by your doctor. Aspirin is sometimes prescribed in early pregnancy to prevent preeclampsia in women with risk factors - ask your obstetrician if you should be on this medication. If you smoke, try to reduce how many cigarettes you smoke or try to quit completely. Do not vape. Other warning signs to watch out for in pregnancy or postpartum: chest pain, obstructed breathing or shortness of breath, seizures, thoughts of hurting yourself or your baby, bleeding, a painful or swollen leg, fever, or headache (see [facility] POST-BIRTH Warning Signs campaign). If these happen call 911. Itching is also not normal in pregnancy and if you experience this, especially over your hands and feet, potentially worse at night, notify your doctors.

## 2023-12-15 ENCOUNTER — TELEMEDICINE (OUTPATIENT)
Facility: HOSPITAL | Age: 33
End: 2023-12-15
Payer: COMMERCIAL

## 2023-12-15 DIAGNOSIS — R73.09 ELEVATED GLUCOSE: ICD-10-CM

## 2023-12-15 DIAGNOSIS — O24.410 DIET CONTROLLED GESTATIONAL DIABETES MELLITUS (GDM) IN SECOND TRIMESTER: ICD-10-CM

## 2023-12-15 DIAGNOSIS — Z3A.24 24 WEEKS GESTATION OF PREGNANCY: ICD-10-CM

## 2023-12-15 DIAGNOSIS — O99.810 ABNORMAL GLUCOSE AFFECTING PREGNANCY: ICD-10-CM

## 2023-12-15 DIAGNOSIS — O24.410 DIET CONTROLLED GESTATIONAL DIABETES MELLITUS (GDM) IN SECOND TRIMESTER: Primary | ICD-10-CM

## 2023-12-15 DIAGNOSIS — R73.09 ELEVATED GLUCOSE: Primary | ICD-10-CM

## 2023-12-15 DIAGNOSIS — O99.212 OBESITY AFFECTING PREGNANCY IN SECOND TRIMESTER, UNSPECIFIED OBESITY TYPE: ICD-10-CM

## 2023-12-15 PROCEDURE — G0109 DIAB MANAGE TRN IND/GROUP: HCPCS

## 2023-12-15 RX ORDER — LANCETS 33 GAUGE
EACH MISCELLANEOUS
Qty: 100 EACH | Refills: 4 | Status: SHIPPED | OUTPATIENT
Start: 2023-12-15 | End: 2024-03-30

## 2023-12-15 RX ORDER — BLOOD-GLUCOSE METER
KIT MISCELLANEOUS
Qty: 1 KIT | Refills: 0 | Status: SHIPPED | OUTPATIENT
Start: 2023-12-15 | End: 2024-03-30

## 2023-12-15 RX ORDER — BLOOD SUGAR DIAGNOSTIC
STRIP MISCELLANEOUS
Qty: 100 STRIP | Refills: 4 | Status: SHIPPED | OUTPATIENT
Start: 2023-12-15 | End: 2024-03-30

## 2023-12-15 NOTE — PATIENT INSTRUCTIONS
Welcome to [facility] at [address] [place] [address] team consists of Myself, [place], [place] and two certified dieticians/educators: Greg Gander Vashti Meckel). Our medical assistants can be reached at [phone],  Monday thru Friday 8 am - 4:15 pm. We look forward to helping you manage your gestational diabetes during pregnancy. As we discussed and reviewed during class 1 please follow our instructions. Self-monitoring Blood Glucose: OneTouch Verio Flex  Always carry your meter and testing supplies with you at all times. Bring you glucose meter to all your appointments in our office. Check your blood sugar - fasting and 2 hours after the first bite of your meal.   Total of 4 blood sugars each day  Fasting: No less than 8 hours and no more than 10 hours from your bedtime snack. Fasting glucose should be checked when you wake up and before you start your day. Check before you have anything to eat or drink. Goal:  Fasting blood sugar (FBS):  60-90 mg/dL  1 hour after meals:  140 or less   2 hour after meals:  120 or less     Report blood sugars by 1221/23 . Please only pick ONE way to report to our team. Choose the best option for you. We need your report to be legible with dates and blood sugar readings for FBS and 2 hours after breakfast, lunch and dinner to be distinguished. * 1. [email or web address] glucose flow sheet: Click on "heart/chart symbol" on the bottom toolbar, center option. You will now see an option for "track my health". Fill out accordingly and remember to hit the save button to submit your readings. *         2. Voicemail [phone]  If no response in 24- 48 hours call again        3. [email or web address] message - Send to [place], [place]. (Medical question - non urgent)   Can include up to 3 images/attachments per message  If using the One Touch Application for your cell phone please only submit a screen shot of electronic logbook "classic".    Automatically logs and organizes your blood glucose results. How to check your blood sugar:  Wash your hands with soap and water or use waterless  to clean your hands. Alcohol can dry your fingers and is not recommended. Alcohol can also cause false, elevated glucose readings. AVOID scented soaps   Gather your glucose meter kit and supplies. Prepare your meter by inserting a new test strip. This will automatically turn on your meter  Make sure test strips are not . Use a new test strip each time. Prepare your lancing device by inserting the lancet               After the lancet is securely in place, twist off the top to reveal needle. Reattach lancing device top   Once lancing device top is reattached, you are now ready to prick the side of your fingertip to get a blood sample. You can adjust the depth setting as needed. We recommend to start at "4". Apply the blood sample to test strip according to instructions. Make sure your sharp container is: heavy duty plastic, puncture-resistant lid, upright and stable, leak resistant, properly labeled. Record your blood sugar   For references and video: go to - http://www.[employer]/    Diet:  Please follow your 1800 calorie (CHO: [id number]) (PRO:2-1-3-1-3-2) (see attachment). -Remember 15 grams of carbohydrates = 1 serving of CHO   -Carbohydrates need to be paired with 7 grams or 1 ounce of protein for balance.  -Food label:   -Start by checking the serving size on the label of packaged foods and drinks. -The amount of calories, carbs, and fats on the label is based on one serving. Be careful as many items contain more than one serving per package. First step is to check total sugars. Remember total sugar should equal 15 grams or less. Second step, check the total grams (g) of carbs in one serving. (See CHO graph conversion chart as reference).    You can calculate the number of servings of carbs in one serving by dividing the total carbs by 15. For example, if a food has 30 g of total carbs, it would be equal to 2 servings of carbs. -Carbohydrates always need to be paired with protein.  -You should be consuming a minimum of 175 grams of carbohydrates daily to avoid ketosis. -Remember to eat 3 meals and 3 snacks a day. Eating every 2 to 3.5 hours during the day. -Be sure to have bedtime snack that includes at least 30 grams of carbohydrates and 2 ounces (14 grams) of protein.  -Shop around the outside edge of the grocery store. This includes fresh fruits and vegetables, bulk grains, fresh meats, and fresh dairy.  -Use low-heat cooking methods, such as baking, instead of high-heat cooking methods like deep frying.  -Cook using healthy oils, such as olive, canola, or sunflower oil.  -Eat meals and snacks regularly, preferably at the same times every day. Avoid going long periods of time without eating.              -Eat some foods each day that contain healthy fats, such as avocado, nuts, seeds, and fish.  -Always check the nutrition information of foods labeled as "low-fat" or "nonfat". These foods may be higher in added sugar or refined carbs and should be avoided. Blood sugars:   -Report to our team on a weekly basis until you deliver. - If ok by your OB try adding a 20-30 minute walk after a meal to help keep glucose within range.  -Continue to follow up with your OB and MFM providers as recommended.  -Stay in close contact with diabetes education team.   -Always have glucose available for hypoglycemia, use 15 by 15 rule. See attachment.   -While sick or feeling ill, follow our sick day guidelines in our GDM booklet. See attachment.   -For travel and dining out tips refer to your GDM booklet. See attachment. Before your class 2 on 12/22/23 try to keep a 3 day food log. Include everything you drink and eat for breakfast, AM snack, lunch, PM snack, dinner and bedtime snack.  Our dietician will review during class 2 and can provide feedback. Very important to maintain tight glucose control during pregnancy to decrease risk factors including fetal macrosomia; birth injury; risk of ; polyhydramnios; pre-term labor; pre-eclampsia;  hypoglycemia; jaundice and stillbirth. Any questions give us a call at [phone] or send us a MyChart message to [facility], [address] [place] [address].    Moe Cullen RN  [facility] at [address] [place] [address]

## 2023-12-15 NOTE — PROGRESS NOTES
I have reviewed the note by our Diabetic educator and agree with the assessment and plan.       Judy Hogan MD 12/15/23

## 2023-12-15 NOTE — PROGRESS NOTES
CLASS 1 - Group  (virtual visit)    Thank you for referring your patient to [facility].     Subjective:     Noe Ogden is a 28 y.o. female who presents today unaccompanied for Gestational Diabetes. Patient is at 20w7d gestation, Estimated Date of Delivery: 3/30/24. See Self Assessment       Visit Diagnosis:  Encounter Diagnosis     ICD-10-CM    1. Elevated glucose  R73.09       2. Obesity affecting pregnancy in second trimester, unspecified obesity type  O99.212       3. 24 weeks gestation of pregnancy  Z3A.24       4. Diet controlled gestational diabetes mellitus (GDM) in second trimester  O24.410            Discussed with patient:  Pathophysiology of Gestational Diabetes   Untreated hyperglycemia in pregnancy and maternal fetal complications (fetal macrosomia,  hypoglycemia, polyhydramnios, increased incidence of  section,  labor, and in severe cases fetal demise and still birth). Importance of blood glucose monitoring, nutrition, and medication if necessary in achieving BG goals. HPI    Diabetes Hx:   Personal History? Unable to assess - group class   Family History of Diabetes?  Unable to assess - group class     Family History   Problem Relation Age of Onset    Carpal tunnel syndrome Mother     No Known Problems Father     Carpal tunnel syndrome Sister     Thyroid disease unspecified Sister     Endometriosis Sister     Hypertension Maternal Grandmother     Dementia Maternal Grandfather     Breast cancer Neg Hx     Colon cancer Neg Hx     Ovarian cancer Neg Hx     Cervical cancer Neg Hx     Uterine cancer Neg Hx         Labs  GDM LABS:  1 Hour GTT:   Lab Results   Component Value Date/Time    [id number] 156 (H) 10/17/2023 09:53 AM      3 Hour GTT:   Lab Results   Component Value Date/Time    GLUF 104 (H) 2023 07:26 AM    NLMEOMT7AZ 214 (H) 2023 08:58 AM    AHLOIWX4GW 207 (H) 2023 09:32 AM    YNJGZGX0CO 110 11/18/2023 10:32 AM        A1C:  Lab Results   Component Value Date/Time    HGBA1C 5.2 11/14/2023 08:09 AM        Labs Ordered This Visit: None    Current Outpatient Medications    Current Outpatient Medications:     aspirin (ECOTRIN LOW STRENGTH) 81 mg EC tablet, Take 162 mg by mouth daily, Disp: , Rfl:     choline fenofibrate (TRILIPIX) 45 MG capsule, Take 45 mg by mouth daily, Disp: , Rfl:     Prenatal Vit-Fe Fumarate-FA (PRENATAL PO), Take by mouth, Disp: , Rfl:      Anthropometrics:  Ht Readings from Last 1 Encounters:   12/12/23 5' 2" (1.575 m)      Wt Readings from Last 3 Encounters:   12/12/23 100 kg (221 lb)   12/11/23 101 kg (223 lb)   11/21/23 100 kg (221 lb)        Pre-Gravid Wt Pre-Gravid BMI TWG   98 kg (216 lb) 39.50 2.268 kg (5 lb)     Total Pregnancy Weight Gain Recommendations: BMI (> 30) 11-20 lbs  Current Wt Status Compared to Recommendations: Within Range -- Continue recommended rate of weight gain based on pre-pregnancy BMI till delivery    Most Recent Ultrasound Results:  Findings: NML Growth/ANA- 12/12/23   Further Fetal Surveillance: None  Next US date: Scheduled Appropriately- 12/19/23 - fetal echo     Blood Glucose Monitoring:     Glucose Meter: OneTouch Verio Flex   *orders for supplies (meter, lancets, strips) based on patients needs pended/routed to appropriate provider after today's visit for review/approval     Instructed on Testing Blood Sugars: 4 x per day (Fasting, 2 hour after start of each meal)  Goals: (Fasting) 60-95mg/dL // (2hr PP) <120mg/dL  Meter Teaching: Gave instruction on site selection, skin preparation, loading strips and lancet device, meter activation, obtaining blood sample, test strip and lancet disposal and storage, and recording log book entries. Blood Sugar Tested in Office?  No (Virtual Visit)  Instructed to report blood sugar results weekly via Phone: [phone] OR [email or web address] (Message with image attachment, or Glucose Flowsheet)    Meal Plan: Patient was provided with a meal plan including 3 meals and 3 snacks  *Calories: 1800 calorie (CHO: 08-63-44-85-39-48) (PRO: 2-1-3-1-3-2)    Review of Patient's Current Diet:  Type of Diet: Regular   Special or ethnic dietary preferences? no  Food Allergies: None  Food Intolerances: None  Receiving [facility] or food stamps? No    Typical Timing/Frequency of Meals and Snacks:   # Meals Daily: 2    # Snacks Daily? 2      Meal Plan Tips Reviewed at Today's Visit:  Appropriate amounts of CHO, PRO, and Fat at each meal and snack. CHO exchange list, and portion sizes for both CHO and PRO via food models  How to read a food label  Suggested meal/snack options to increase nutrition and maintain consistent meal and snack intakes  Eat every 2.0-3.5 hours while awake  Go no longer than 8-10 hours fasting overnight until first meal of the day. Physical Activity:  Currently physically active? 3 days for 30 minutes at a time    Reviewed w/ Pt:   Benefits of physical activity to optimize blood glucose control, encouraged activity at patient is physically able. Instructed pt to always consult a physician prior to starting an exercise program.   Recommend 20-30 minutes daily. Patient Stated Goal: "I will check my blood sugar 4 times each day, as directed by diabetes and pregnancy team"    Expected Compliance: excellent  Barriers to Learning/Change: No Barriers  Diabetes Self Management Support Plan (outside of ongoing care): Spouse/Family     Date to Report Blood Sugars: Day Before Class 2, Then Weekly (till delivery)    Class 2: 12/22/23   *Patient instructed to bring 3 day food diary and/or write down foods associated with elevated after meal blood sugars    Begin Time: 9:00 am       End Time: 9:50 am     It was a pleasure working with them today. Please feel free to call [phone]) with any questions or concerns.     Angel Whitehead RN   Diabetes Educator  [facility] Maternal Fetal Medicine  Diabetes and Pregnancy Program  [address] [facility], [address] [facility]  Virtual Regular Visit    Verification of patient location:    Patient is located at Home in the following state in which I hold an active license [place]      Assessment/Plan:    Problem List Items Addressed This Visit       24 weeks gestation of pregnancy    Obesity affecting pregnancy in second trimester    Diet controlled gestational diabetes mellitus (GDM) in second trimester     Other Visit Diagnoses       Elevated glucose    -  Primary    Abnormal glucose affecting pregnancy                     Reason for visit is   Chief Complaint   Patient presents with    Virtual Regular Visit          Encounter provider Yamil Perry RN    Provider located at [address] [place] [id number]      Recent Visits  No visits were found meeting these conditions. Showing recent visits within past 7 days and meeting all other requirements  Today's Visits  Date Type Provider Dept   12/15/23 Telemedicine Yamil Perry RN [facility]    Showing today's visits and meeting all other requirements  Future Appointments  No visits were found meeting these conditions. Showing future appointments within next 150 days and meeting all other requirements       The patient was identified by name and date of birth. Saundra Brooks was informed that this is a telemedicine visit and that the visit is being conducted through the [address] [place] [address] [employer] platform. She agrees to proceed. .  My office door was closed. No one else was in the room. She acknowledged consent and understanding of privacy and security of the video platform. The patient has agreed to participate and understands they can discontinue the visit at any time. Patient is aware this is a billable service. Subjective  Saundra Brooks is a 28 y.o. female  .       HPI     Past Medical History:   Diagnosis Date    Abnormal Pap smear of cervix     abn & HPV    Ectopic pregnancy     2/2021    Migraine     occas    Miscarriage 2/2021    Ectopic Pregnancy    Varicella     childhood chickenpox       Past Surgical History:   Procedure Laterality Date    [place] LAPAROSCOPY W/RMVL ADNEXAL STRUCTURES N/A 2/14/2021    Procedure: diagnostic LAPAROSCOPIC evacuation of hemoperitoneam,;  Surgeon: Cindy Lowery MD;  Location: [facility];  Service: Gynecology       Current Outpatient Medications   Medication Sig Dispense Refill    aspirin (ECOTRIN LOW STRENGTH) 81 mg EC tablet Take 162 mg by mouth daily      choline fenofibrate (TRILIPIX) 45 MG capsule Take 45 mg by mouth daily      Prenatal Vit-Fe Fumarate-FA (PRENATAL PO) Take by mouth       No current facility-administered medications for this visit. No Known Allergies    Review of Systems    Video Exam    There were no vitals filed for this visit.     Physical Exam     Visit Time  Total Visit Duration: 50 minutes

## 2023-12-19 ENCOUNTER — ROUTINE PRENATAL (OUTPATIENT)
Dept: PERINATAL CARE | Facility: OTHER | Age: 33
End: 2023-12-19
Payer: COMMERCIAL

## 2023-12-19 ENCOUNTER — CLINICAL SUPPORT (OUTPATIENT)
Dept: POSTPARTUM | Facility: CLINIC | Age: 33
End: 2023-12-19

## 2023-12-19 VITALS
WEIGHT: 221 LBS | HEART RATE: 104 BPM | DIASTOLIC BLOOD PRESSURE: 84 MMHG | BODY MASS INDEX: 40.67 KG/M2 | SYSTOLIC BLOOD PRESSURE: 136 MMHG | HEIGHT: 62 IN

## 2023-12-19 DIAGNOSIS — Z3A.25 25 WEEKS GESTATION OF PREGNANCY: ICD-10-CM

## 2023-12-19 DIAGNOSIS — Z32.2 ENCOUNTER FOR CHILDBIRTH INSTRUCTION: Primary | ICD-10-CM

## 2023-12-19 DIAGNOSIS — O24.410 DIET CONTROLLED GESTATIONAL DIABETES MELLITUS (GDM) IN SECOND TRIMESTER: Primary | ICD-10-CM

## 2023-12-19 PROCEDURE — 76815 OB US LIMITED FETUS(S): CPT | Performed by: OBSTETRICS & GYNECOLOGY

## 2023-12-19 PROCEDURE — 93325 DOPPLER ECHO COLOR FLOW MAPG: CPT | Performed by: OBSTETRICS & GYNECOLOGY

## 2023-12-19 PROCEDURE — 76825 ECHO EXAM OF FETAL HEART: CPT | Performed by: OBSTETRICS & GYNECOLOGY

## 2023-12-19 PROCEDURE — 76827 ECHO EXAM OF FETAL HEART: CPT | Performed by: OBSTETRICS & GYNECOLOGY

## 2023-12-19 PROCEDURE — 99214 OFFICE O/P EST MOD 30 MIN: CPT | Performed by: OBSTETRICS & GYNECOLOGY

## 2023-12-19 NOTE — PATIENT INSTRUCTIONS
Thank you for choosing us for your  care today.  If you have any questions about your ultrasound or care, please do not hesitate to contact us or your primary obstetrician.      Some general instructions for your pregnancy are:    Protect against coronavirus: get vaccinated - pregnant women are increased risk of severe COVID.  Notify your primary care doctor if you have any symptoms.    Exercise: Aim for 22 minutes per day (150 minutes per week) of regular exercise.  Walking is great!  Nutrition: aim for calcium-rich and iron-rich foods as well as healthy sources of protein.    Learn about Preeclampsia: preeclampsia is a common, serious high blood pressure complication in pregnancy.  A blood pressure of 140mmHg (systolic or top number) or 90mmHg (diastolic or bottom number) is not normal and needs evaluation by your doctor.  Aspirin is sometimes prescribed in early pregnancy to prevent preeclampsia in women with risk factors - ask your obstetrician if you should be on this medication.  If you smoke, try to reduce how many cigarettes you smoke or try to quit completely.  Do not vape.    Other warning signs to watch out for in pregnancy or postpartum: chest pain, obstructed breathing or shortness of breath, seizures, thoughts of hurting yourself or your baby, bleeding, a painful or swollen leg, fever, or headache (see [facility] POST-BIRTH Warning Signs campaign).  If these happen call 911.  Itching is also not normal in pregnancy and if you experience this, especially over your hands and feet, potentially worse at night, notify your doctors.

## 2023-12-19 NOTE — PROGRESS NOTES
"[facility]: Leslee Tellez was seen today for fetal screening echocardiogram.  See ultrasound report under \"OB Procedures\" tab.   The time spent on this established patient on the encounter date included 5 minutes previsit service time reviewing records and precharting, 6 minutes face-to-face service time counseling regarding results and coordinating care, and  5 minutes charting, totalling 16 minutes.  Please don't hesitate to contact our office with any concerns or questions.  -Angela Andrade MD      "

## 2023-12-21 ENCOUNTER — TELEPHONE (OUTPATIENT)
Dept: PERINATAL CARE | Facility: CLINIC | Age: 33
End: 2023-12-21

## 2023-12-21 DIAGNOSIS — O24.410 DIET CONTROLLED GESTATIONAL DIABETES MELLITUS IN SECOND TRIMESTER: Primary | ICD-10-CM

## 2023-12-27 ENCOUNTER — TELEMEDICINE (OUTPATIENT)
Dept: PERINATAL CARE | Facility: CLINIC | Age: 33
End: 2023-12-27
Payer: COMMERCIAL

## 2023-12-27 DIAGNOSIS — Z3A.26 26 WEEKS GESTATION OF PREGNANCY: ICD-10-CM

## 2023-12-27 DIAGNOSIS — O24.410 DIET CONTROLLED GESTATIONAL DIABETES MELLITUS (GDM) IN SECOND TRIMESTER: Primary | ICD-10-CM

## 2023-12-27 DIAGNOSIS — E66.09 OTHER OBESITY DUE TO EXCESS CALORIES AFFECTING PREGNANCY IN SECOND TRIMESTER: ICD-10-CM

## 2023-12-27 DIAGNOSIS — O99.212 OTHER OBESITY DUE TO EXCESS CALORIES AFFECTING PREGNANCY IN SECOND TRIMESTER: ICD-10-CM

## 2023-12-27 PROCEDURE — G0109 DIAB MANAGE TRN IND/GROUP: HCPCS | Performed by: DIETITIAN, REGISTERED

## 2023-12-27 NOTE — PROGRESS NOTES
Virtual Regular Visit    Verification of patient location:  [place]    Patient is located at Home in the following state in which I hold an active license [place]      Assessment/Plan:    Problem List Items Addressed This Visit    None           Reason for visit is   Chief Complaint   Patient presents with    Virtual Regular Visit          Encounter provider Xochitl Hathaway    Provider located at [address] [address]  [phone]      Recent Visits  Date Type Provider Dept   23 Telephone Xochitl OBREGON [facility]   Showing recent visits within past 7 days and meeting all other requirements  Today's Visits  Date Type Provider Dept   23 Telemedicine Xochitl OBREGON [facility]   Showing today's visits and meeting all other requirements  Future Appointments  No visits were found meeting these conditions.  Showing future appointments within next 150 days and meeting all other requirements       The patient was identified by name and date of birth. Leslee Tellez was informed that this is a telemedicine visit and that the visit is being conducted through the [employer] platform. She agrees to proceed..  My office door was closed. No one else was in the room.  She acknowledged consent and understanding of privacy and security of the video platform. The patient has agreed to participate and understands they can discontinue the visit at any time.    Patient is aware this is a billable service.     Subjective  Leslee Tellez is a 33 y.o. female pregnant patient .      HPI     Past Medical History:   Diagnosis Date    Abnormal Pap smear of cervix     abn & HPV    Ectopic pregnancy     2021    Migraine     occas    Miscarriage 2021    Ectopic Pregnancy    Varicella     childhood chickenpox       Past Surgical History:   Procedure Laterality Date    [place] LAPAROSCOPY W/RMVL ADNEXAL STRUCTURES N/A 2021     Procedure: diagnostic LAPAROSCOPIC evacuation of hemoperitoneam,;  Surgeon: Kierra Berg MD;  [facility];  Service: Gynecology       Current Outpatient Medications   Medication Sig Dispense Refill    aspirin (ECOTRIN LOW STRENGTH) 81 mg EC tablet Take 162 mg by mouth daily      Blood Glucose Monitoring Suppl (OneTouch Verio Reflect) w/Device KIT Dispense 1 kit per insurance formulary. GDM (Patient not taking: Reported on 12/19/2023) 1 kit 0    choline fenofibrate (TRILIPIX) 45 MG capsule Take 45 mg by mouth daily      OneTouch Delica Lancets 33G MISC Use 4 a day. GDM (Patient not taking: Reported on 12/19/2023) 100 each 4    OneTouch Verio test strip Test 4 times a day. GDM (Patient not taking: Reported on 12/19/2023) 100 strip 4    Prenatal Vit-Fe Fumarate-FA (PRENATAL PO) Take by mouth       No current facility-administered medications for this visit.        No Known Allergies    Review of Systems    Video Exam    There were no vitals filed for this visit.    Physical Exam --not performed    Visit Time  Total Visit Duration: 60 minutes        Thank you for referring your patient to [facility] Maternal Fetal Medicine Diabetes in Pregnancy Program.     Leslee Tellez is a  33 y.o. female who presents today for  Group Class 2.  Patient is at 26w4d gestation, Estimated Date of Delivery: 3/30/24.     Reviewed and updated the following from patients medical record: PMH, Problem List, Allergies, and Current Medications.    Visit Diagnosis:  Diet controlled GDM    Additional Pregnancy Complications:  Overweight piror to pregnancy    Labs:    Lab Results   Component Value Date    [id number] 156 (H) 10/17/2023       Lab Results   Component Value Date    GLUF 104 (H) 11/18/2023    TWFOQKS5JV 214 (H) 11/18/2023    WHXWAHA2IA 207 (H) 11/18/2023    TVOHVRB2FZ 110 11/18/2023 11/14/23 dvZ4t-4.2%    Medications:  No diabetes related medications    Anthropometrics:  Ht Readings from Last 3 Encounters:  "  23 5' 2\" (1.575 m)   23 5' 2\" (1.575 m)   23 5' 2\" (1.575 m)     Wt Readings from Last 3 Encounters:   23 100 kg (221 lb)   23 100 kg (221 lb)   23 101 kg (223 lb)     Pre-gravid weight: 98 kg (216 lb)  Pre-gravid BMI: 39.50  Weight Change: 2.268 kg (5 lb)  Weight gain recommendations: BMI (> 30) 11-20 lbs  Comments: patient may gain 15 more pounds for the remainder of the pregnancy.     Recent Ultra Sound Results:  Date: 23  Fetal Growth:Normal  ANA: Normal  Next US date: 23    Blood Glucose Monitoring:  Reinforcement of blood glucose goals and reporting guidelines.     Glucose Meter:  One-Touch Verio Reflect  Testing blood sugars: 4 x per day (Fasting, 2 hour after start of each meal)  Method of reporting blood sugars:Glucose Flowsheet    Report blood sugar results weekly via:  Phone: [phone]   OR  My Chart (Message with image attachment, or Glucose Flowsheet)    Goal Blood Sugar Ranges:   Fastin-90 mg/dL  1 hour after the start of each meal: 140 mg/dL or less  2 hours after start of each meal: 120 mg/dL or less      BG Log:  Review of blood glucose log.  Discussed at Class 2 today.      Reports she only had her meter for 1 day.     Meal Plan:  Calories: 1800 calorie (CHO: 45-15-45-15-45-30) (PRO:2-1-3-1-3-2)    Diet Type: Low Carbohydrate  Additional Nutrition concerns: Enjoys sweets--amy chocolate pieces or cookies amy at bedtime snack.     24 hr Diet Recall:  Provided at Class 2 today    Diet review: Eats 3 meals & 3 snacks, but admits to overeating at lunch & dinner amy the CHO foods.     Diet Instruction:  The patient was instructed on the following:  Individualized meal plan.   Importance of consistent carbohydrate intake via 3 meals and 3 snacks per day   Importance of protein as it relates to blood glucose control.   Encouraged  patient to eat every 2.0-3.5 hours while awake  Encouraged patient to go no longer than 8-10 hours fasting overnight " "until first meal of the day.  Provided suggested meal/snack options to increase nutrition and maintain consistent meal and snack intakes.      Physical Activity:  Discussed benefits of physical activity to optimize blood glucose control, encouraged activity at patient is physically able. Always consult a physician prior to starting an exercise program. Recommend 20-30 minutes daily.  Is patient physically active? Yes Walking & cleaning her home.     Sick day Guidelines:   Patient advised that sickness will raise blood sugar and need to continue medication regimen as directed. If blood sugar is > 160 mg/dL twice in one day call doctor. Instructed on what to do when unable to consume normal meal plan.     Hypoglycemia & Treatment Guidelines:  Reviewed what hypoglycemia is, signs and symptoms, and how to treat via the 15:15 rule.    Post-Partum Guidelines:  Completion of 75 gm CHO 2 hr gtt at 6 weeks post-partum to check for Type 2 DM diagnosis    Breastfeeding Guidelines:  Continue GDM meal plan plus additional 350-500 calories daily. Stay hydrated by drinking 8-10 (8 oz.) fluids daily. Examples of protein and carbohydrate snacks provided.    Dining Out & Travel Guidelines:  Patient advised to be prepared with extra diabetes supplies, medications, and snacks, as well as sticking to the same time schedule and portions eaten at home for meals and snacks.    Maternal-Fetal Testing:    Ultrasounds- growth scans every 4 weeks.   NST- twice weekly starting at 32nd week GA   ANA- weekly starting at 32 weeks GA    Patient Stated Goal: \"I will check my blood sugar 4 times each day, as directed by diabetes and pregnancy team\"  Goal Assessment: On track    Diabetes Self Management Support Plan outside of ongoing care: Spouse/Family    Learner/s Present:Learners Present: Patient   Barriers to Learning/Change: No Barriers  Expected Compliance: good    Date to report blood sugars: Weekly  Follow up date: As Needed    Begin Time: " 8;19 AM  End Time: 9: 25 AM    It was a pleasure working with them today. Please feel free to call with any questions or concerns.    Xochitl Hathaway, MS, RD, [facility]  Diabetes Educator  [facility] Maternal Fetal Medicine  Diabetes in Pregnancy Program  [address], [address]  [place] [address]

## 2024-01-04 ENCOUNTER — DOCUMENTATION (OUTPATIENT)
Dept: PERINATAL CARE | Facility: CLINIC | Age: 34
End: 2024-01-04

## 2024-01-04 NOTE — PROGRESS NOTES
"  Date: 01/04/24  Leslee Tellez  1990  Estimated Date of Delivery: 3/30/24  27w5d  OB/GYN:[facility]    Diagnosis:  Diet controlled GDM    Blood Sugar Logs Submitted via: Glucose Flowsheet      Assessment and Plan:  No diabetes related medications.Increased results p lunch & dinner.; enjoys sweets. & larger portions. Watch portions.      1800 calorie (CHO: 45-15-45-15-45-30) (PRO:2-1-3-1-3-2) meal plan consisting of 3 meals and 3 snacks daily including protein at each  Advised patient to  Avoid all sweets  Avoid fasting for > 10 hours overnight  Continue SMBG 4 x per day (Fasting, 2 hour after start of each meal) with a One-Touch Verio Reflect glucose meter  Walking & cleaning her home    Lab Work:   Lab Results   Component Value Date    HGBA1C 5.2 11/14/2023        Ultrasound:       Date:12/12/23       Fetal Growth: Normal       ANA: Normal  Next: 1/16/24    Diabetes Self Management Support Plan outside of ongoing care: Spouse/Family   Patient Stated Goal: \"I will check my blood sugar 4 times each day, as directed by diabetes and pregnancy team\"   Goal Assessment: On track    Date to report next: Weekly     Xochitl Hathaway MS, RD, [facility]  Diabetes Educator  [facility] Maternal Fetal Medicine  Diabetes in Pregnancy Program  [address], [address]  [place] [address]    "

## 2024-01-06 ENCOUNTER — LAB (OUTPATIENT)
Dept: LAB | Facility: HOSPITAL | Age: 34
End: 2024-01-06
Payer: COMMERCIAL

## 2024-01-06 DIAGNOSIS — Z34.82 PRENATAL CARE, SUBSEQUENT PREGNANCY, SECOND TRIMESTER: ICD-10-CM

## 2024-01-06 LAB
BASOPHILS # BLD AUTO: 0.04 THOUSANDS/ÂΜL (ref 0–0.1)
BASOPHILS NFR BLD AUTO: 0 % (ref 0–1)
EOSINOPHIL # BLD AUTO: 0.14 THOUSAND/ÂΜL (ref 0–0.61)
EOSINOPHIL NFR BLD AUTO: 1 % (ref 0–6)
ERYTHROCYTE [DISTWIDTH] IN BLOOD BY AUTOMATED COUNT: 12.9 % (ref 11.6–15.1)
HCT VFR BLD AUTO: 32.5 % (ref 34.8–46.1)
HGB BLD-MCNC: 10.9 G/DL (ref 11.5–15.4)
IMM GRANULOCYTES # BLD AUTO: 0.07 THOUSAND/UL (ref 0–0.2)
IMM GRANULOCYTES NFR BLD AUTO: 1 % (ref 0–2)
LYMPHOCYTES # BLD AUTO: 2.17 THOUSANDS/ÂΜL (ref 0.6–4.47)
LYMPHOCYTES NFR BLD AUTO: 22 % (ref 14–44)
MCH RBC QN AUTO: 30.5 PG (ref 26.8–34.3)
MCHC RBC AUTO-ENTMCNC: 33.5 G/DL (ref 31.4–37.4)
MCV RBC AUTO: 91 FL (ref 82–98)
MONOCYTES # BLD AUTO: 0.76 THOUSAND/ÂΜL (ref 0.17–1.22)
MONOCYTES NFR BLD AUTO: 8 % (ref 4–12)
NEUTROPHILS # BLD AUTO: 6.49 THOUSANDS/ÂΜL (ref 1.85–7.62)
NEUTS SEG NFR BLD AUTO: 68 % (ref 43–75)
NRBC BLD AUTO-RTO: 0 /100 WBCS
PLATELET # BLD AUTO: 355 THOUSANDS/UL (ref 149–390)
PMV BLD AUTO: 8.5 FL (ref 8.9–12.7)
RBC # BLD AUTO: 3.57 MILLION/UL (ref 3.81–5.12)
TREPONEMA PALLIDUM IGG+IGM AB [PRESENCE] IN SERUM OR PLASMA BY IMMUNOASSAY: NORMAL
WBC # BLD AUTO: 9.67 THOUSAND/UL (ref 4.31–10.16)

## 2024-01-06 PROCEDURE — 36415 COLL VENOUS BLD VENIPUNCTURE: CPT

## 2024-01-06 PROCEDURE — 86780 TREPONEMA PALLIDUM: CPT

## 2024-01-06 PROCEDURE — 85025 COMPLETE CBC W/AUTO DIFF WBC: CPT

## 2024-01-08 ENCOUNTER — TELEPHONE (OUTPATIENT)
Dept: OBGYN CLINIC | Facility: CLINIC | Age: 34
End: 2024-01-08

## 2024-01-08 NOTE — TELEPHONE ENCOUNTER
----- Message from GLENNA Toscano sent at 1/8/2024 12:58 PM EST -----  BW showing development of anemia  recommend iron QOD. This should be taken with either vitamin C tablets or Orange   Juice.   Rev.constipation is very common with increased iron.  can take colace (docusate sodium) a stool softener as needed (up to twice per day) for constipation. These are all over the counter medications.

## 2024-01-08 NOTE — RESULT ENCOUNTER NOTE
BW showing development of anemia  recommend iron QOD. This should be taken with either vitamin C tablets or Orange   Juice.   Rev.constipation is very common with increased iron.  can take colace (docusate sodium) a stool softener as needed (up to twice per day) for constipation. These are all over the counter medications.

## 2024-01-09 ENCOUNTER — ROUTINE PRENATAL (OUTPATIENT)
Dept: OBGYN CLINIC | Facility: MEDICAL CENTER | Age: 34
End: 2024-01-09
Payer: COMMERCIAL

## 2024-01-09 VITALS
SYSTOLIC BLOOD PRESSURE: 126 MMHG | DIASTOLIC BLOOD PRESSURE: 84 MMHG | WEIGHT: 220.4 LBS | HEART RATE: 113 BPM | BODY MASS INDEX: 40.31 KG/M2

## 2024-01-09 DIAGNOSIS — Z34.82 PRENATAL CARE, SUBSEQUENT PREGNANCY, SECOND TRIMESTER: Primary | ICD-10-CM

## 2024-01-09 DIAGNOSIS — F32.A ANXIETY AND DEPRESSION: ICD-10-CM

## 2024-01-09 DIAGNOSIS — F41.9 ANXIETY AND DEPRESSION: ICD-10-CM

## 2024-01-09 DIAGNOSIS — Z23 NEED FOR TDAP VACCINATION: ICD-10-CM

## 2024-01-09 DIAGNOSIS — O24.410 DIET CONTROLLED GESTATIONAL DIABETES MELLITUS (GDM) IN SECOND TRIMESTER: ICD-10-CM

## 2024-01-09 DIAGNOSIS — O99.019 MATERNAL ANEMIA, ANTEPARTUM: ICD-10-CM

## 2024-01-09 DIAGNOSIS — E66.09 OTHER OBESITY DUE TO EXCESS CALORIES AFFECTING PREGNANCY IN SECOND TRIMESTER: ICD-10-CM

## 2024-01-09 DIAGNOSIS — Z3A.28 28 WEEKS GESTATION OF PREGNANCY: ICD-10-CM

## 2024-01-09 DIAGNOSIS — Z34.83 ENCOUNTER FOR SUPERVISION OF OTHER NORMAL PREGNANCY IN THIRD TRIMESTER: ICD-10-CM

## 2024-01-09 DIAGNOSIS — O10.919 CHRONIC HYPERTENSION AFFECTING PREGNANCY: ICD-10-CM

## 2024-01-09 DIAGNOSIS — O99.212 OTHER OBESITY DUE TO EXCESS CALORIES AFFECTING PREGNANCY IN SECOND TRIMESTER: ICD-10-CM

## 2024-01-09 LAB
SL AMB  POCT GLUCOSE, UA: NORMAL
SL AMB POCT URINE PROTEIN: NORMAL

## 2024-01-09 PROCEDURE — 90715 TDAP VACCINE 7 YRS/> IM: CPT

## 2024-01-09 PROCEDURE — PNV: Performed by: NURSE PRACTITIONER

## 2024-01-09 PROCEDURE — 81002 URINALYSIS NONAUTO W/O SCOPE: CPT | Performed by: NURSE PRACTITIONER

## 2024-01-09 PROCEDURE — 90471 IMMUNIZATION ADMIN: CPT

## 2024-01-09 NOTE — ASSESSMENT & PLAN NOTE
TWG #  4  Limit weight gain to 11-20 lbs.   Regular exercise encouraged.   Low dose  mg po daily encouraged to reduce risk of pre E and adverse pregnancy outcomes until 36 weeks.   Fetal growth scans every 4-6 weeks in 3rd trimester.  MFM appt on 1/16/24

## 2024-01-09 NOTE — ASSESSMENT & PLAN NOTE
Lab Results   Component Value Date    HGBA1C 5.2 11/14/2023    MFM follow up on 1/16/24  Following with DP  States euglycemic.

## 2024-01-09 NOTE — PROGRESS NOTES
Patient presents for a routine prenatal visit    28W3D  Good Fetal Movement  No LOF,bleeding,discharge or cramping.  No current complaints at this time.     Yellow folder given to patient and reviewed at today's visit.   Plans on breastfeeding, breast pump already ordered through her insurance.  Pediatrician referral ordered today.  28 week labs are completed.    Tdap given in L Deltoid. VIS given. Tolerated well.  Lot #: [id number]  Exp: 2025

## 2024-01-09 NOTE — ASSESSMENT & PLAN NOTE
Leslee Tellez is a 33 y.o.  here for OB visit at 28w3d weeks. TWG 1.996 kg (4 lb 6.4 oz).  No health concerns.     Denies LOF, vaginal bleeding or contractions.   Performing FKC's daily 10 in 2 hours  28 week labs show anemia, otherwise normal labs. See separate diagnosis.   TDAP received today.   Influenza vaccine refused; aware of need of antivirals if positive.   Rhogam not indicated  Covid vaccine recommended  MFM appt on 23  Tour and lactation classes are upcoming.   RTO 2 weeks    The patient was counseled about the labor process. She was counseled regarding potential reasons that she may need a  section including arrest of dilation/descent, non-reassuring fetal status, or worsening maternal status.      She was counseled on the risks of  including bleeding, infection, and injury to surrounding structures including the bowel and bladder.   She was counseled that there are medical and surgical methods to manage excessive postpartum bleeding. She was counseled that in the event of excessive blood loss, she may require blood transfusion which includes a small risk of blood borne diseases such as hepatitis and HIV. The patient is OK with receiving a blood transfusion if necessary.  The patient had an opportunity to ask questions and signed consent.      She was counseled about the possible need for operative delivery using the vacuum or forceps and the indications for doing so. She was counseled that there is a small risk of  complications including intracranial hemorrhage, lacerations, nerve damage or fracture as well as the increased risk for more severe maternal laceration. The patient signed consent.

## 2024-01-09 NOTE — PROGRESS NOTES
Problem   Maternal Anemia, Antepartum   Chronic Hypertension Affecting Pregnancy   Diet Controlled Gestational Diabetes Mellitus (Gdm) in Second Trimester   Anxiety and Depression   Obesity Affecting Pregnancy in Second Trimester   28 Weeks Gestation of Pregnancy    Consent signed  TDAP received 24       28 weeks gestation of pregnancy  Leslee Tellez is a 33 y.o.  here for OB visit at 28w3d weeks. TWG 1.996 kg (4 lb 6.4 oz).  No health concerns.     Denies LOF, vaginal bleeding or contractions.   Performing FKC's daily 10 in 2 hours  28 week labs show anemia, otherwise normal labs. See separate diagnosis.   TDAP received today.   Influenza vaccine refused; aware of need of antivirals if positive.   Rhogam not indicated  Covid vaccine recommended  MFM appt on 23  Tour and lactation classes are upcoming.   RTO 2 weeks    The patient was counseled about the labor process. She was counseled regarding potential reasons that she may need a  section including arrest of dilation/descent, non-reassuring fetal status, or worsening maternal status.      She was counseled on the risks of  including bleeding, infection, and injury to surrounding structures including the bowel and bladder.   She was counseled that there are medical and surgical methods to manage excessive postpartum bleeding. She was counseled that in the event of excessive blood loss, she may require blood transfusion which includes a small risk of blood borne diseases such as hepatitis and HIV. The patient is OK with receiving a blood transfusion if necessary.  The patient had an opportunity to ask questions and signed consent.      She was counseled about the possible need for operative delivery using the vacuum or forceps and the indications for doing so. She was counseled that there is a small risk of  complications including intracranial hemorrhage, lacerations, nerve damage or fracture as well as the increased risk  for more severe maternal laceration. The patient signed consent.     Maternal anemia, antepartum  1/6/23 H/H 10.6/32.5;   Iron supplement QOD recommended with vit C.Will start today.   PNV daily 8 hours apart from iron supplement.   Will consider repeat in 4 weeks. Order placed.     Anxiety and depression  Follow up with counseling not needed at this time.   Negative 2 question depression screen  SI/HI    Obesity affecting pregnancy in second trimester  TWG #  4  Limit weight gain to 11-20 lbs.   Regular exercise encouraged.   Low dose  mg po daily encouraged to reduce risk of pre E and adverse pregnancy outcomes until 36 weeks.   Fetal growth scans every 4-6 weeks in 3rd trimester.  MFM appt on 1/16/24    Diet controlled gestational diabetes mellitus (GDM) in second trimester    Lab Results   Component Value Date    HGBA1C 5.2 11/14/2023    MFM follow up on 1/16/24  Following with DP  States euglycemic.    Chronic hypertension affecting pregnancy  Normotensive today.    MG po daily until 36 weeks.   MFM appt  1/16/24.

## 2024-01-09 NOTE — ASSESSMENT & PLAN NOTE
1/6/23 H/H 10.6/32.5;   Iron supplement QOD recommended with vit C.Will start today.   PNV daily 8 hours apart from iron supplement.   Will consider repeat in 4 weeks. Order placed.

## 2024-01-15 ENCOUNTER — DOCUMENTATION (OUTPATIENT)
Dept: PERINATAL CARE | Facility: CLINIC | Age: 34
End: 2024-01-15

## 2024-01-15 NOTE — PROGRESS NOTES
"  Date: 01/15/24  Leslee Tellez  1990  Estimated Date of Delivery: 3/30/24  29w2d  OB/GYN:[facility]    Diagnosis:  Diet controlled GDM    Blood Sugar Logs Submitted via: Glucose Flowsheet      Assessment and Plan:  No diabetes related medications.Increased results p lunch & dinner.; enjoys sweets. & larger portions. Watch portions.      1800 calorie (CHO: 45-15-45-15-45-30) (PRO:2-1-3-1-3-2) meal plan consisting of 3 meals and 3 snacks daily including protein at each  Advised patient to  Change bedtime snack to 1 CHO serving (15 gm) & 2-3 oz protein (examples of snacks were provided) & change dinner to 2 CHO serving (30 gm) & increase to 4 oz protein.   Avoid fasting for > 10 hours overnight  Continue SMBG 4 x per day (Fasting, 2 hour after start of each meal) with a One-Touch Verio Reflect glucose meter  Walking & cleaning her home    Lab Work:   Lab Results   Component Value Date    HGBA1C 5.2 11/14/2023        Ultrasound:       Date:12/12/23       Fetal Growth: Normal       ANA: Normal  Next: 1/16/24    Diabetes Self Management Support Plan outside of ongoing care: Spouse/Family   Patient Stated Goal: \"I will check my blood sugar 4 times each day, as directed by diabetes and pregnancy team\"   Goal Assessment: On track    Date to report next: Weekly     Xochitl Hathaway, MS, RD, [facility]  Diabetes Educator  [facility]aternal Fetal [facility]  Diabetes in Pregnancy Program  [address], [address]  [place] [address]    "

## 2024-01-20 ENCOUNTER — CLINICAL SUPPORT (OUTPATIENT)
Age: 34
End: 2024-01-20

## 2024-01-20 DIAGNOSIS — Z32.2 ENCOUNTER FOR CHILDBIRTH INSTRUCTION: Primary | ICD-10-CM

## 2025-03-28 NOTE — ASSESSMENT & PLAN NOTE
C/o heightened anxiety and depression in past few weeks. Feels interaction with family is a trigger. Requesting referral to counseling. Was on lexapro prior to pregnancy but self d/c with pregnancy. Reviewed safety of medications in pregnancy and recommendations pertaining to use. Declines to restart medication at this time but is aware to call with worsening/changing symptoms. No SI/HI reported. Referral to [facility] placed and contact information given.
Pregravid BMI 39.5. No WG thus far in pregnancy. Recommended initiation of LDASA for preE prevention given nulliparity and elevated BMI. She will plan to start this today. Planning to go for early 1 hr GT after today's apts. Has nutrition consult scheduled 10/27/23   Level II US scheduled.
Tesfaye Dick  is a 28 y.o.  @16w3d who presents for routine prenatal visit. Denies OB complaints. NIPT - low risk   AFP - order provided   NT scan completed 23  Level II - scheduled 23    Has not yet appreciated fetal movement. Denies contractions, cramping, leakage of fluid or vaginal bleeding. Reviewed PTL precautions and reasons to call.
87

## (undated) DEVICE — HEMOSTATIC MATRIX SURGIFLO 8ML W/THROMBIN

## (undated) DEVICE — TUBING SMOKE EVAC W/FILTRATION DEVICE PLUMEPORT ACTIV

## (undated) DEVICE — TROCAR: Brand: KII FIOS FIRST ENTRY

## (undated) DEVICE — CHLORAPREP HI-LITE 26ML ORANGE

## (undated) DEVICE — INTENDED FOR TISSUE SEPARATION, AND OTHER PROCEDURES THAT REQUIRE A SHARP SURGICAL BLADE TO PUNCTURE OR CUT.: Brand: BARD-PARKER SAFETY BLADES SIZE 11, STERILE

## (undated) DEVICE — PVC URETHRAL CATHETER: Brand: DOVER

## (undated) DEVICE — PENCIL ELECTROSURG E-Z CLEAN -0035H

## (undated) DEVICE — TROCAR: Brand: KII® SLEEVE

## (undated) DEVICE — HEAVY DUTY TABLE COVER: Brand: CONVERTORS

## (undated) DEVICE — GLOVE INDICATOR UNDERGLOVE SZ 6 BLUE

## (undated) DEVICE — TOWEL SURG XR DETECT GREEN STRL RFD

## (undated) DEVICE — ADHESIVE SKIN HIGH VISCOSITY EXOFIN 1ML

## (undated) DEVICE — STERILE SURGICAL LUBRICANT,  TUBE: Brand: SURGILUBE

## (undated) DEVICE — CHLORHEXIDINE 4PCT 4 OZ

## (undated) DEVICE — GLOVE PI ULTRA TOUCH SZ 6

## (undated) DEVICE — ELECTRODE LAP SPATULA CRV E-Z CLEAN 33CM -0018C

## (undated) DEVICE — ENSEAL LAPAROSCOPIC TISSUE SEALER G2 ARTICULATING  CURVED JAW FOR USE WITH G2 GENERATOR 5MM DIAMETER 35CM SHAFT LENGTH: Brand: ENSEAL

## (undated) DEVICE — TISSUE RETRIEVAL SYSTEM: Brand: INZII RETRIEVAL SYSTEM

## (undated) DEVICE — IRRIG ENDO FLO TUBING

## (undated) DEVICE — SUT MONOCRYL 4-0 PS-2 27 IN Y426H

## (undated) DEVICE — PREMIUM DRY TRAY LF: Brand: MEDLINE INDUSTRIES, INC.

## (undated) DEVICE — BETHLEHEM UNIVERSAL GYN LAP PK: Brand: CARDINAL HEALTH

## (undated) DEVICE — SURGIFLO ENDOSCOPIC APPICATOR: Brand: ETHICON